# Patient Record
Sex: FEMALE | Race: BLACK OR AFRICAN AMERICAN | Employment: FULL TIME | URBAN - METROPOLITAN AREA
[De-identification: names, ages, dates, MRNs, and addresses within clinical notes are randomized per-mention and may not be internally consistent; named-entity substitution may affect disease eponyms.]

---

## 2017-05-13 ENCOUNTER — HOSPITAL ENCOUNTER (EMERGENCY)
Age: 32
Discharge: HOME | End: 2017-05-13
Attending: EMERGENCY MEDICINE
Payer: COMMERCIAL

## 2017-05-13 ENCOUNTER — APPOINTMENT (OUTPATIENT)
Dept: GENERAL RADIOLOGY | Age: 32
End: 2017-05-13
Attending: PHYSICIAN ASSISTANT
Payer: COMMERCIAL

## 2017-05-13 VITALS
HEIGHT: 65 IN | OXYGEN SATURATION: 100 % | TEMPERATURE: 98.6 F | HEART RATE: 88 BPM | BODY MASS INDEX: 30.82 KG/M2 | RESPIRATION RATE: 16 BRPM | WEIGHT: 185 LBS

## 2017-05-13 DIAGNOSIS — M54.50 ACUTE LEFT-SIDED LOW BACK PAIN WITHOUT SCIATICA: ICD-10-CM

## 2017-05-13 DIAGNOSIS — M79.642 HAND PAIN, LEFT: ICD-10-CM
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
## 2017-05-13 LAB — HCG UR QL: NEGATIVE

## 2017-05-13 PROCEDURE — 73130 X-RAY EXAM OF HAND: CPT

## 2017-05-13 PROCEDURE — 99284 EMERGENCY DEPT VISIT MOD MDM: CPT

## 2017-05-13 PROCEDURE — 72100 X-RAY EXAM L-S SPINE 2/3 VWS: CPT

## 2017-05-13 PROCEDURE — 74011250637 HC RX REV CODE- 250/637: Performed by: PHYSICIAN ASSISTANT

## 2017-05-13 PROCEDURE — 81025 URINE PREGNANCY TEST: CPT

## 2017-05-13 RX ORDER — OXYCODONE AND ACETAMINOPHEN 5; 325 MG/1; MG/1
1 TABLET ORAL
Qty: 10 TAB | Refills: 0 | Status: SHIPPED | OUTPATIENT
Start: 2017-05-13 | End: 2020-03-13

## 2017-05-13 RX ORDER — BUDESONIDE AND FORMOTEROL FUMARATE DIHYDRATE 160; 4.5 UG/1; UG/1
2 AEROSOL RESPIRATORY (INHALATION) 2 TIMES DAILY
COMMUNITY
End: 2020-08-12 | Stop reason: ALTCHOICE

## 2017-05-13 RX ORDER — METHOCARBAMOL 750 MG/1
750 TABLET, FILM COATED ORAL 4 TIMES DAILY
Qty: 12 TAB | Refills: 0 | Status: SHIPPED | OUTPATIENT
Start: 2017-05-13 | End: 2020-03-13

## 2017-05-13 RX ORDER — ONDANSETRON 4 MG/1
4 TABLET, ORALLY DISINTEGRATING ORAL
Status: COMPLETED | OUTPATIENT
Start: 2017-05-13 | End: 2017-05-13

## 2017-05-13 RX ORDER — OXYCODONE AND ACETAMINOPHEN 5; 325 MG/1; MG/1
1 TABLET ORAL
Status: COMPLETED | OUTPATIENT
Start: 2017-05-13 | End: 2017-05-13

## 2017-05-13 RX ADMIN — ONDANSETRON 4 MG: 4 TABLET, ORALLY DISINTEGRATING ORAL at 15:49

## 2017-05-13 RX ADMIN — OXYCODONE HYDROCHLORIDE AND ACETAMINOPHEN 1 TABLET: 5; 325 TABLET ORAL at 15:49

## 2017-05-13 NOTE — ED NOTES
Pt discharged by LORNE Santos. Pt provided with discharge instructions Rx and instructions on follow up care. Pt ambulated out of ED.
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
## 2017-05-13 NOTE — LETTER
[address] 
[facility] EMERGENCY DEPT 
[address] [place] [address] [address] [id number] [phone] Work/School Note Date: 5/13/2017 To Whom It May concern: 
 
Feli Ricardo was seen and treated today in the emergency room by the following provider(s): 
Attending Provider: Donna Knox DO Physician Assistant: LORNE Estevez. Feli Ricardo No work 48 hours. Sincerely, LORNE Estevez

## 2020-03-13 ENCOUNTER — OFFICE VISIT (OUTPATIENT)
Dept: FAMILY MEDICINE CLINIC | Age: 35
End: 2020-03-13

## 2020-03-13 VITALS
TEMPERATURE: 97.7 F | DIASTOLIC BLOOD PRESSURE: 76 MMHG | OXYGEN SATURATION: 98 % | SYSTOLIC BLOOD PRESSURE: 114 MMHG | BODY MASS INDEX: 30.79 KG/M2 | HEIGHT: 65 IN | HEART RATE: 71 BPM | RESPIRATION RATE: 16 BRPM

## 2020-03-13 DIAGNOSIS — J30.1 SEASONAL ALLERGIC RHINITIS DUE TO POLLEN: ICD-10-CM

## 2020-03-13 DIAGNOSIS — S46.811A STRAIN OF RIGHT TRAPEZIUS MUSCLE, INITIAL ENCOUNTER: Primary | ICD-10-CM

## 2020-03-13 RX ORDER — CYCLOBENZAPRINE HCL 5 MG
5 TABLET ORAL
Qty: 60 TAB | Refills: 0 | Status: SHIPPED | OUTPATIENT
Start: 2020-03-13 | End: 2020-04-24

## 2020-03-13 RX ORDER — AZELASTINE 1 MG/ML
1 SPRAY, METERED NASAL 2 TIMES DAILY
Qty: 1 BOTTLE | Refills: 0 | Status: SHIPPED | OUTPATIENT
Start: 2020-03-13 | End: 2020-04-06

## 2020-03-13 RX ORDER — MELOXICAM 7.5 MG/1
7.5 TABLET ORAL DAILY
Qty: 30 TAB | Refills: 0 | Status: SHIPPED | OUTPATIENT
Start: 2020-03-13 | End: 2020-04-06

## 2020-03-13 RX ORDER — MONTELUKAST SODIUM 10 MG/1
10 TABLET ORAL DAILY
Qty: 90 TAB | Refills: 1 | Status: SHIPPED | OUTPATIENT
Start: 2020-03-13 | End: 2020-08-12 | Stop reason: ALTCHOICE
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
## 2020-04-05 DIAGNOSIS — S46.811A STRAIN OF RIGHT TRAPEZIUS MUSCLE, INITIAL ENCOUNTER: ICD-10-CM

## 2020-04-05 DIAGNOSIS — J30.1 SEASONAL ALLERGIC RHINITIS DUE TO POLLEN: ICD-10-CM

## 2020-04-06 RX ORDER — AZELASTINE 1 MG/ML
1 SPRAY, METERED NASAL 2 TIMES DAILY
Qty: 1 BOTTLE | Refills: 0 | Status: SHIPPED | OUTPATIENT
Start: 2020-04-06 | End: 2021-03-10 | Stop reason: ALTCHOICE

## 2020-04-06 RX ORDER — MELOXICAM 7.5 MG/1
TABLET ORAL
Qty: 30 TAB | Refills: 0 | Status: SHIPPED | OUTPATIENT
Start: 2020-04-06 | End: 2020-08-12 | Stop reason: ALTCHOICE

## 2020-04-09 ENCOUNTER — VIRTUAL VISIT (OUTPATIENT)
Dept: FAMILY MEDICINE CLINIC | Age: 35
End: 2020-04-09

## 2020-04-09 DIAGNOSIS — S60.022A CONTUSION OF LEFT INDEX FINGER WITHOUT DAMAGE TO NAIL, INITIAL ENCOUNTER: ICD-10-CM

## 2020-04-09 DIAGNOSIS — M79.645 PAIN OF FINGER OF LEFT HAND: ICD-10-CM

## 2020-04-09 DIAGNOSIS — M77.9 TENDONITIS: Primary | ICD-10-CM

## 2020-04-09 RX ORDER — METHYLPREDNISOLONE 4 MG/1
TABLET ORAL
Qty: 1 DOSE PACK | Refills: 0 | Status: SHIPPED | OUTPATIENT
Start: 2020-04-09 | End: 2020-08-12 | Stop reason: ALTCHOICE

## 2020-04-09 NOTE — PROGRESS NOTES
Consent: Shavonne Madrigal, who was seen by synchronous (real-time) audio-video technology, and/or her healthcare decision maker, is aware that this patient-initiated, Telehealth encounter on 4/9/2020 is a billable service, with coverage as determined by her insurance carrier. She is aware that she may receive a bill and has provided verbal consent to proceed: Yes. Assessment & Plan:   Diagnoses and all orders for this visit:    1. Contusion of left index finger with damage to nail, initial encounter    2. Pain of finger of left hand        Pt was evaluated. Pt has new pain to left index finger. Pain began yesterday and today has mild swelling and increased pain to distal end of index finger. Pt has FROM but has a tightness feeling and increased pain. Pt denies any known trauma. Denies  hx of arthritis/ gout. Has been playing with child and may have injured it then. Pt to immobilize finger, use RICE. Medrol dose pack prescribed to decrease inflammation in the event of gout or tendonitis. Has normal Cap refill and color. Denies any bite or sting. I spent at least 10 minutes with this established patient, and >50% of the time was spent counseling and/or coordinating care regarding Pt present s with a 1 day hx of left index finger pain and swelling. Pain is past Distal joint, mild swelling and tenderness. Pt denies any sting or known event that caused pain. 712  Subjective:   Shavonne Madrigal is a 38 y.o. female who was seen for Finger Swelling      Prior to Admission medications    Medication Sig Start Date End Date Taking? Authorizing Provider   meloxicam (MOBIC) 7.5 mg tablet TAKE 1 TABLET BY MOUTH EVERY DAY 4/6/20   Citlaly Bowling MD   azelastine (ASTELIN) 137 mcg (0.1 %) nasal spray 1 SPRAY BY BOTH NOSTRILS ROUTE TWO (2) TIMES A DAY. USE IN EACH NOSTRIL AS DIRECTED 4/6/20   Citlaly Bowling MD   cetirizine HCl (ZYRTEC PO) Take  by mouth.     Provider, Historical   cyclobenzaprine (FLEXERIL) 5 mg tablet Take 1 Tab by mouth two (2) times daily as needed for Muscle Spasm(s). 3/13/20   Damon Bowling MD   montelukast (SINGULAIR) 10 mg tablet Take 1 Tab by mouth daily. 3/13/20   Damon Bowling MD   budesonide-formoterol (SYMBICORT) 160-4.5 mcg/actuation HFA inhaler Take 2 Puffs by inhalation two (2) times a day. Shilpi Weston MD   albuterol (PROVENTIL HFA) 90 mcg/actuation inhaler Take 2 Puffs by inhalation every four (4) hours as needed for Wheezing. 7/3/14   LORNE Conrad     Allergies   Allergen Reactions    Bee Pollen Swelling           Review of Systems   Constitutional: Negative. HENT: Negative. Eyes: Negative. Respiratory: Negative. Cardiovascular: Negative. Musculoskeletal: Positive for joint pain (left index finger pain and edema). Skin:        Edema to left index finger   Neurological: Negative. Endo/Heme/Allergies: Positive for environmental allergies. Psychiatric/Behavioral: Negative. Objective:   Vital Signs: (As obtained by patient/caregiver at home)  There were no vitals taken for this visit.      [INSTRUCTIONS:  \"[x]\" Indicates a positive item  \"[]\" Indicates a negative item  -- DELETE ALL ITEMS NOT EXAMINED]    Constitutional: [x] Appears well-developed and well-nourished [x] No apparent distress      [] Abnormal -     Mental status: [x] Alert and awake  [x] Oriented to person/place/time [x] Able to follow commands    [] Abnormal -     Eyes:   EOM    [x]  Normal    [] Abnormal -   Sclera  [x]  Normal    [] Abnormal -          Discharge [x]  None visible   [] Abnormal -     HENT: [x] Normocephalic, atraumatic  [] Abnormal -   [x] Mouth/Throat: Mucous membranes are moist    External Ears [x] Normal  [] Abnormal -    Neck: [x] No visualized mass [] Abnormal -     Pulmonary/Chest: [x] Respiratory effort normal   [x] No visualized signs of difficulty breathing or respiratory distress        [] Abnormal -      Musculoskeletal:   [x] Normal gait with no signs of ataxia         [x] Normal range of motion of neck        [x] Abnormal -left index finger pain     Neurological:        [x] No Facial Asymmetry (Cranial nerve 7 motor function) (limited exam due to video visit)          [x] No gaze palsy        [] Abnormal -          Skin:        [x] No significant exanthematous lesions or discoloration noted on facial skin         [] Abnormal -            Psychiatric:       [x] Normal Affect [] Abnormal -        [x] No Hallucinations    Other pertinent observable physical exam findings:-        We discussed the expected course, resolution and complications of the diagnosis(es) in detail. Medication risks, benefits, costs, interactions, and alternatives were discussed as indicated. I advised her to contact the office if her condition worsens, changes or fails to improve as anticipated. She expressed understanding with the diagnosis(es) and plan. Winnie Sparrow is a 38 y.o. female being evaluated by a video visit encounter for concerns as above. A caregiver was present when appropriate. Due to this being a TeleHealth encounter (During MUSWA-49 public health emergency), evaluation of the following organ systems was limited: Vitals/Constitutional/EENT/Resp/CV/GI//MS/Neuro/Skin/Heme-Lymph-Imm. Pursuant to the emergency declaration under the [facility], 1135 waiver authority and the [employer] Act, this Virtual  Visit was conducted, with patient's (and/or legal guardian's) consent, to reduce the patient's risk of exposure to COVID-19 and provide necessary medical care. Services were provided through a video synchronous discussion virtually to substitute for in-person clinic visit.    Patient on VV, provider in the office        Karol Oneil NP

## 2020-04-16 ENCOUNTER — VIRTUAL VISIT (OUTPATIENT)
Dept: FAMILY MEDICINE CLINIC | Age: 35
End: 2020-04-16

## 2020-04-16 DIAGNOSIS — M79.644 PAIN OF FINGER OF RIGHT HAND: Primary | ICD-10-CM

## 2020-04-16 RX ORDER — DICLOFENAC SODIUM 10 MG/G
1 GEL TOPICAL 2 TIMES DAILY
Qty: 100 G | Refills: 1 | Status: SHIPPED | OUTPATIENT
Start: 2020-04-16 | End: 2020-08-12 | Stop reason: ALTCHOICE

## 2020-04-16 NOTE — PROGRESS NOTES
Assessment and Plan    1. Pain of finger of right hand    - diclofenac (VOLTAREN) 1 % gel; Apply 1 g to affected area two (2) times a day. Right index finger  Dispense: 100 g; Refill: 1      Follow-up and Dispositions    · Return if symptoms worsen or fail to improve. Diagnosis and plan discussed with patient who verbillized understanding. History of present Caprice Kiser is a 38 y.o. female presenting for right index finger pain. Right index finger pain DIP joint  Noticed pain after playing with daughter. No real injury. No puncture  No prior arthritis other than mild back pain. Review of Systems   Musculoskeletal: Positive for joint pain. Past Medical History:   Diagnosis Date    Asthma     bronchitis 1yr ago    Ill-defined condition     dislocated left jaw due to [place] Palsy    Neurological disorder     [place] Palsy    Proximal phalanx fracture of finger 2010     Past Surgical History:   Procedure Laterality Date    HX ORTHOPAEDIC       No family history on file.   Social History     Socioeconomic History    Marital status: SINGLE     Spouse name: Not on file    Number of children: Not on file    Years of education: Not on file    Highest education level: Not on file   Occupational History    Not on file   Social Needs    Financial resource strain: Not on file    Food insecurity     Worry: Not on file     Inability: Not on file    Transportation needs     Medical: Not on file     Non-medical: Not on file   Tobacco Use    Smoking status: Current Some Day Smoker     Last attempt to quit: 6/3/2014     Years since quittin.8    Smokeless tobacco: Former User   Substance and Sexual Activity    Alcohol use: Yes     Comment: socially    Drug use: No    Sexual activity: Yes     Partners: Male     Birth control/protection: None, Condom   Lifestyle    Physical activity     Days per week: Not on file     Minutes per session: Not on file    Stress: Not on file   Relationships    Social connections     Talks on phone: Not on file     Gets together: Not on file     Attends Yazidi service: Not on file     Active member of club or organization: Not on file     Attends meetings of clubs or organizations: Not on file     Relationship status: Not on file    Intimate partner violence     Fear of current or ex partner: Not on file     Emotionally abused: Not on file     Physically abused: Not on file     Forced sexual activity: Not on file   Other Topics Concern    Not on file   Social History Narrative    Not on file         Prior to Admission medications    Medication Sig Start Date End Date Taking? Authorizing Provider   diclofenac (VOLTAREN) 1 % gel Apply 1 g to affected area two (2) times a day. Right index finger 4/16/20  Yes Liza Chairez MD   methylPREDNISolone (MEDROL DOSEPACK) 4 mg tablet Take as directed on dose pack  Indications: inflammation of a tendon 4/9/20   Marie Youssef NP   meloxicam (MOBIC) 7.5 mg tablet TAKE 1 TABLET BY MOUTH EVERY DAY 4/6/20   Garret Bowling MD   azelastine (ASTELIN) 137 mcg (0.1 %) nasal spray 1 SPRAY BY BOTH NOSTRILS ROUTE TWO (2) TIMES A DAY. USE IN EACH NOSTRIL AS DIRECTED 4/6/20   Garret Bowling MD   cetirizine HCl (ZYRTEC PO) Take  by mouth. Provider, Historical   cyclobenzaprine (FLEXERIL) 5 mg tablet Take 1 Tab by mouth two (2) times daily as needed for Muscle Spasm(s). 3/13/20   Garret Bowling MD   montelukast (SINGULAIR) 10 mg tablet Take 1 Tab by mouth daily. 3/13/20   Garret Bowling MD   budesonide-formoterol (SYMBICORT) 160-4.5 mcg/actuation HFA inhaler Take 2 Puffs by inhalation two (2) times a day. Enrico, MD Shilpi   albuterol (PROVENTIL HFA) 90 mcg/actuation inhaler Take 2 Puffs by inhalation every four (4) hours as needed for Wheezing. 7/3/14   LORNE Villalobos        Allergies   Allergen Reactions    Bee Pollen Swelling       There were no vitals filed for this visit.   There is no height or weight on file to calculate BMI. Objective  Physical Exam  General: Patient alert and oriented and in NAD  Right index finger- mildly tender and mildly swollen DIP. No external signs of trauma or puncture, no flex or extensor tendon laxity, no ligamentous laxity. Psych: Appropriate mood and affect, no homicidal or suicidal ideation, no obsessions, delusions or hallucinations, normal psychomotor status.

## 2020-04-22 ENCOUNTER — OFFICE VISIT (OUTPATIENT)
Dept: FAMILY MEDICINE CLINIC | Age: 35
End: 2020-04-22

## 2020-04-22 VITALS
TEMPERATURE: 98.5 F | HEART RATE: 74 BPM | RESPIRATION RATE: 16 BRPM | SYSTOLIC BLOOD PRESSURE: 105 MMHG | BODY MASS INDEX: 35.16 KG/M2 | DIASTOLIC BLOOD PRESSURE: 69 MMHG | WEIGHT: 211 LBS | HEIGHT: 65 IN

## 2020-04-22 DIAGNOSIS — J30.1 SEASONAL ALLERGIC RHINITIS DUE TO POLLEN: ICD-10-CM

## 2020-04-22 DIAGNOSIS — J01.10 ACUTE NON-RECURRENT FRONTAL SINUSITIS: Primary | ICD-10-CM

## 2020-04-22 RX ORDER — AZITHROMYCIN 250 MG/1
TABLET, FILM COATED ORAL
Qty: 6 TAB | Refills: 0 | Status: ON HOLD | OUTPATIENT
Start: 2020-04-22 | End: 2020-09-08

## 2020-04-22 RX ORDER — GUAIFENESIN 600 MG/1
600 TABLET, EXTENDED RELEASE ORAL 2 TIMES DAILY
Qty: 14 TAB | Refills: 0 | Status: SHIPPED | OUTPATIENT
Start: 2020-04-22 | End: 2020-04-29

## 2020-04-22 NOTE — PROGRESS NOTES
Sinus Pain  Patient complains of congestion, facial pain, nasal congestion and post nasal drip. Symptoms include congestion, facial pain and sinus pressure with no fever, chills, or night sweats. Onset of symptoms was 1 week ago, gradually worsening since that time. She is drinking plenty of fluids. .  Past history is significant for asthma. Patient is non-smoker    Duane Islam is a 38 y.o. female who presents to clinic today for the following:    Chief Complaint   Patient presents with    Facial Swelling      and facial pain 2or 3 months       Vitals:    04/22/20 1044   BP: 105/69   Pulse: 74   Resp: 16   Temp: 98.5 °F (36.9 °C)   Weight: 211 lb (95.7 kg)   Height: 5' 5\" (1.651 m)       Body mass index is 35.11 kg/m². Patients past medical, surgical and family histories were reviewed. Allergies and Medications reviewed and updated. Current Outpatient Medications:     diclofenac (VOLTAREN) 1 % gel, Apply 1 g to affected area two (2) times a day. Right index finger, Disp: 100 g, Rfl: 1    azelastine (ASTELIN) 137 mcg (0.1 %) nasal spray, 1 SPRAY BY BOTH NOSTRILS ROUTE TWO (2) TIMES A DAY. USE IN EACH NOSTRIL AS DIRECTED, Disp: 1 Bottle, Rfl: 0    cetirizine HCl (ZYRTEC PO), Take  by mouth., Disp: , Rfl:     montelukast (SINGULAIR) 10 mg tablet, Take 1 Tab by mouth daily. , Disp: 90 Tab, Rfl: 1    albuterol (PROVENTIL HFA) 90 mcg/actuation inhaler, Take 2 Puffs by inhalation every four (4) hours as needed for Wheezing., Disp: 1 Inhaler, Rfl: 1    methylPREDNISolone (MEDROL DOSEPACK) 4 mg tablet, Take as directed on dose pack  Indications: inflammation of a tendon, Disp: 1 Dose Pack, Rfl: 0    meloxicam (MOBIC) 7.5 mg tablet, TAKE 1 TABLET BY MOUTH EVERY DAY, Disp: 30 Tab, Rfl: 0    cyclobenzaprine (FLEXERIL) 5 mg tablet, Take 1 Tab by mouth two (2) times daily as needed for Muscle Spasm(s). , Disp: 60 Tab, Rfl: 0    budesonide-formoterol (SYMBICORT) 160-4.5 mcg/actuation HFA inhaler, Take 2 Puffs by inhalation two (2) times a day., Disp: , Rfl:     Allergies   Allergen Reactions    Bee Pollen Swelling       Past Medical History:   Diagnosis Date    Asthma     bronchitis 1yr ago    Ill-defined condition     dislocated left jaw due to [place] Palsy    Neurological disorder     [place] Palsy    Proximal phalanx fracture of finger 2010       Past Surgical History:   Procedure Laterality Date    HX ORTHOPAEDIC         History reviewed. No pertinent family history.     Social History     Socioeconomic History    Marital status: SINGLE     Spouse name: Not on file    Number of children: Not on file    Years of education: Not on file    Highest education level: Not on file   Occupational History    Not on file   Social Needs    Financial resource strain: Not on file    Food insecurity     Worry: Not on file     Inability: Not on file    Transportation needs     Medical: Not on file     Non-medical: Not on file   Tobacco Use    Smoking status: Current Some Day Smoker     Last attempt to quit: 6/3/2014     Years since quittin.8    Smokeless tobacco: Former User   Substance and Sexual Activity    Alcohol use: Yes     Comment: socially    Drug use: No    Sexual activity: Yes     Partners: Male     Birth control/protection: None, Condom   Lifestyle    Physical activity     Days per week: Not on file     Minutes per session: Not on file    Stress: Not on file   Relationships    Social connections     Talks on phone: Not on file     Gets together: Not on file     Attends Jain service: Not on file     Active member of club or organization: Not on file     Attends meetings of clubs or organizations: Not on file     Relationship status: Not on file    Intimate partner violence     Fear of current or ex partner: Not on file     Emotionally abused: Not on file     Physically abused: Not on file     Forced sexual activity: Not on file   Other Topics Concern    Not on file   Social History Narrative    Not on file           Physical Exam  Vitals signs reviewed. HENT:      Head: Normocephalic and atraumatic. Jaw: Tenderness present. Comments: TM fluid filled, non infectious     Right Ear: Tympanic membrane is bulging. Left Ear: Tympanic membrane is bulging. Nose: Congestion present. Right Turbinates: Swollen. Left Turbinates: Swollen. Right Sinus: Maxillary sinus tenderness and frontal sinus tenderness present. Left Sinus: Maxillary sinus tenderness and frontal sinus tenderness present. Comments: Nasal congestion     Mouth/Throat:      Mouth: Mucous membranes are moist.   Eyes:      Pupils: Pupils are equal, round, and reactive to light. Neck:      Musculoskeletal: Normal range of motion. Cardiovascular:      Rate and Rhythm: Normal rate. Pulmonary:      Effort: Pulmonary effort is normal.      Breath sounds: Normal breath sounds. Musculoskeletal: Normal range of motion. Skin:     General: Skin is dry. Neurological:      General: No focal deficit present. Mental Status: She is alert and oriented to person, place, and time. Mental status is at baseline. Psychiatric:         Mood and Affect: Mood normal.         Behavior: Behavior normal.         Thought Content: Thought content normal.         Judgment: Judgment normal.          HPI      Review of Systems   Constitutional: Negative. HENT: Positive for ear pain and sinus pain. Eyes: Negative. Respiratory: Negative. Cardiovascular: Negative. Gastrointestinal: Negative. Musculoskeletal: Negative. Skin: Negative. Neurological: Negative. Psychiatric/Behavioral: Negative. No results found. No results found for this or any previous visit (from the past 24 hour(s)). Assessment and Plan:    Encounter Diagnoses   Name Primary?     Acute non-recurrent frontal sinusitis Yes    Seasonal allergic rhinitis due to pollen         Pt was seen for onset of bilateral ear pain, sinus pain/ congestion. Pt has tried otc meds without relief, has been getting worse. Pt has fluid filled TM's and left enlarged tonsil. Pt today given abx for continued sinusitis, mucinex x 7 days, advised to use Zytec x 10 days, use nasal sprays and sinus wash for ROS. Take probiotic with abx, follow up as needed. 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 I have discussed the diagnosis with the patient and the intended plan as seen in the above orders. she has expressed understanding. The patient has received an after-visit summary and questions were answered concerning future plans. I have discussed medication side effects and warnings with the patient as well.         Electronically Signed: Mellisa Ross NP

## 2020-04-23 DIAGNOSIS — S46.811A STRAIN OF RIGHT TRAPEZIUS MUSCLE, INITIAL ENCOUNTER: ICD-10-CM

## 2020-04-24 RX ORDER — CYCLOBENZAPRINE HCL 5 MG
TABLET ORAL
Qty: 60 TAB | Refills: 0 | Status: SHIPPED | OUTPATIENT
Start: 2020-04-24 | End: 2020-11-04 | Stop reason: SDUPTHER

## 2020-08-12 ENCOUNTER — HOSPITAL ENCOUNTER (OUTPATIENT)
Dept: LAB | Age: 35
Discharge: HOME | End: 2020-08-12

## 2020-08-12 ENCOUNTER — VIRTUAL VISIT (OUTPATIENT)
Dept: FAMILY MEDICINE CLINIC | Age: 35
End: 2020-08-12
Payer: COMMERCIAL

## 2020-08-12 DIAGNOSIS — R91.8 HILAR MASS: ICD-10-CM

## 2020-08-12 DIAGNOSIS — M35.3 PMR (POLYMYALGIA RHEUMATICA) (HCC): ICD-10-CM

## 2020-08-12 DIAGNOSIS — R91.8 HILAR MASS: Primary | ICD-10-CM

## 2020-08-12 DIAGNOSIS — Z76.89 ENCOUNTER TO ESTABLISH CARE: ICD-10-CM

## 2020-08-12 LAB
BASOPHILS # BLD: 0 K/UL (ref 0–0.1)
BASOPHILS NFR BLD: 1 % (ref 0–1)
CRP SERPL-MCNC: 0.52 MG/DL (ref 0–0.6)
DIFFERENTIAL METHOD BLD: NORMAL
EOSINOPHIL # BLD: 0.1 K/UL (ref 0–0.4)
EOSINOPHIL NFR BLD: 2 % (ref 0–7)
ERYTHROCYTE [DISTWIDTH] IN BLOOD BY AUTOMATED COUNT: 13.2 % (ref 11.5–14.5)
ERYTHROCYTE [SEDIMENTATION RATE] IN BLOOD: 10 MM/HR (ref 0–20)
HCT VFR BLD AUTO: 41.4 % (ref 35–47)
HGB BLD-MCNC: 13.1 G/DL (ref 11.5–16)
IMM GRANULOCYTES # BLD AUTO: 0 K/UL (ref 0–0.04)
IMM GRANULOCYTES NFR BLD AUTO: 0 % (ref 0–0.5)
LYMPHOCYTES # BLD: 2 K/UL (ref 0.8–3.5)
LYMPHOCYTES NFR BLD: 35 % (ref 12–49)
MCH RBC QN AUTO: 28.1 PG (ref 26–34)
MCHC RBC AUTO-ENTMCNC: 31.6 G/DL (ref 30–36.5)
MCV RBC AUTO: 88.8 FL (ref 80–99)
MONOCYTES # BLD: 0.4 K/UL (ref 0–1)
MONOCYTES NFR BLD: 6 % (ref 5–13)
NEUTS SEG # BLD: 3.1 K/UL (ref 1.8–8)
NEUTS SEG NFR BLD: 56 % (ref 32–75)
NRBC # BLD: 0 K/UL (ref 0–0.01)
NRBC BLD-RTO: 0 PER 100 WBC
PLATELET # BLD AUTO: 238 K/UL (ref 150–400)
PMV BLD AUTO: 11.5 FL (ref 8.9–12.9)
RBC # BLD AUTO: 4.66 M/UL (ref 3.8–5.2)
WBC # BLD AUTO: 5.7 K/UL (ref 3.6–11)

## 2020-08-12 PROCEDURE — 99214 OFFICE O/P EST MOD 30 MIN: CPT | Performed by: NURSE PRACTITIONER

## 2020-08-12 RX ORDER — VALACYCLOVIR HYDROCHLORIDE 500 MG/1
TABLET, FILM COATED ORAL
COMMUNITY
Start: 2020-06-22

## 2020-08-12 NOTE — PROGRESS NOTES
Vernell Andrade is a 38 y.o. female who was seen by synchronous (real-time) audio-video technology on 8/12/2020 for Other (Pt requesting a referral for Pulmonologist )        Assessment & Plan:   Diagnoses and all orders for this visit:    1. Hilar mass  -     REFERRAL TO PULMONARY DISEASE  -     ANGIOTENSIN CONVERTING ENZYME; Future  -     CT CHEST W WO CONT; Future  - Presumed stab, referral to pulmonology, updated CT and lab work    2. PMR (polymyalgia rheumatica) (HCC)  -     SED RATE (ESR); Future  -     C REACTIVE PROTEIN, QT; Future  -     CBC WITH AUTOMATED DIFF; Future  -     DRAGAN, DIRECT, W/REFLEX; Future  -     RHEUMATOID FACTOR, QT; Future  - Unchanged, labwork today, follow up based on labwork. Consider Rheumatology referral    3. Encounter to establish care        I spent at least 25 minutes on this visit with this new patient. Subjective:    VV today for establishing care. Has a history of sarcoidosis. Had two CT scans for hilar mass found in 2016, mass was unchanged and sarcoidosis was considered. Has seen pulmonologist but never got definitive diagnosis. Would like another referral to pulmonology and updated imaging. Family history of sarcoidosis in uncle      Bilateral shoulder pain. States for the last 2-3 weeks has been waking up with bilateral shoulder pain, pain across chest and neck. Described as aching and stiffness and once \"gets moving\" will subside. Sometimes the pain will begin a gain in the evening. Has tried Aleve with minimal relief. Prior to Admission medications    Medication Sig Start Date End Date Taking? Authorizing Provider   valACYclovir (VALTREX) 500 mg tablet daily as needed. 6/22/20  Yes Provider, Historical   cyclobenzaprine (FLEXERIL) 5 mg tablet TAKE 1 TABLET BY MOUTH TWICE DAILY AS NEEDED FOR MUSCLE SPASMS 4/24/20  Yes Olesya Bowling MD   azelastine (ASTELIN) 137 mcg (0.1 %) nasal spray 1 SPRAY BY BOTH NOSTRILS ROUTE TWO (2) TIMES A DAY.  USE IN [facility] NOSTRIL AS DIRECTED 4/6/20  Yes Hlavac, Florance Hashimoto, MD   cetirizine HCl (ZYRTEC PO) Take  by mouth. Yes Provider, Francisco   albuterol (PROVENTIL HFA) 90 mcg/actuation inhaler Take 2 Puffs by inhalation every four (4) hours as needed for Wheezing. 7/3/14  Yes LORNE Veloz   azithromycin (ZITHROMAX) 250 mg tablet Take 2 tablets today, then take 1 tablet daily  Indications: sinusitis 4/22/20 4/27/20  David Mcdonald NP   diclofenac (VOLTAREN) 1 % gel Apply 1 g to affected area two (2) times a day. Right index finger 4/16/20 8/12/20  Paul Samano MD   methylPREDNISolone (MEDROL DOSEPACK) 4 mg tablet Take as directed on dose pack  Indications: inflammation of a tendon 4/9/20 8/12/20  David Mcdonald NP   meloxicam (MOBIC) 7.5 mg tablet TAKE 1 TABLET BY MOUTH EVERY DAY 4/6/20 8/12/20  Hlavac, Florance Hashimoto, MD   montelukast (SINGULAIR) 10 mg tablet Take 1 Tab by mouth daily. 3/13/20 8/12/20  Hlavac, Florance Hashimoto, MD   budesonide-formoterol (SYMBICORT) 160-4.5 mcg/actuation HFA inhaler Take 2 Puffs by inhalation two (2) times a day. 8/12/20  Shilpi Weston MD     Patient Active Problem List   Diagnosis Code    Proximal phalanx fracture of finger S62.619A       Review of Systems   Constitutional: Negative for chills, fever and malaise/fatigue. HENT: Positive for congestion. Negative for sore throat. Eyes: Negative for blurred vision. Respiratory: Negative for cough and shortness of breath. Cardiovascular: Negative for chest pain, palpitations and leg swelling. Musculoskeletal: Positive for joint pain and neck pain. Neurological: Negative for dizziness and headaches.        Objective:     Patient-Reported Vitals 8/12/2020   Patient-Reported Weight 215lb   Patient-Reported Height 5ft5in   Patient-Reported Pulse 86   Patient-Reported Temperature 98.2   Patient-Reported Systolic  249   Patient-Reported Diastolic 69        [INSTRUCTIONS:  \"[x]\" Indicates a positive item  \"[]\" Indicates a negative item  -- DELETE ALL ITEMS NOT EXAMINED]    Constitutional: [x] Appears well-developed and well-nourished [x] No apparent distress      [] Abnormal -     Mental status: [x] Alert and awake  [x] Oriented to person/place/time [x] Able to follow commands    [] Abnormal -     Eyes:   EOM    [x]  Normal    [] Abnormal -   Sclera  [x]  Normal    [] Abnormal -          Discharge [x]  None visible   [] Abnormal -     HENT: [x] Normocephalic, atraumatic  [] Abnormal -   [x] Mouth/Throat: Mucous membranes are moist    External Ears [x] Normal  [] Abnormal -    Neck: [x] No visualized mass [] Abnormal -     Pulmonary/Chest: [x] Respiratory effort normal   [x] No visualized signs of difficulty breathing or respiratory distress        [] Abnormal -      Musculoskeletal:   [x] Normal gait with no signs of ataxia         [x] Normal range of motion of neck        [] Abnormal -     Neurological:        [x] No Facial Asymmetry (Cranial nerve 7 motor function) (limited exam due to video visit)          [x] No gaze palsy        [] Abnormal -          Skin:        [x] No significant exanthematous lesions or discoloration noted on facial skin         [] Abnormal -            Psychiatric:       [x] Normal Affect [] Abnormal -        [x] No Hallucinations    Other pertinent observable physical exam findings:-        We discussed the expected course, resolution and complications of the diagnosis(es) in detail. Medication risks, benefits, costs, interactions, and alternatives were discussed as indicated. I advised her to contact the office if her condition worsens, changes or fails to improve as anticipated. She expressed understanding with the diagnosis(es) and plan. Fransico Dunn, who was evaluated through a patient-initiated, synchronous (real-time) audio-video encounter, and/or her healthcare decision maker, is aware that it is a billable service, with coverage as determined by her insurance carrier.  She provided verbal consent to proceed: Yes, and patient identification was verified. It was conducted pursuant to the emergency declaration under the [address] [place] [address] and the [employer] and [employer] General Act. A caregiver was present when appropriate. Ability to conduct physical exam was limited. I was at home. The patient was at home.       Eldon Apley, NP

## 2020-08-12 NOTE — PROGRESS NOTES
Identified pt with two pt identifiers(name and ). Reviewed record in preparation for visit and have obtained necessary documentation. Chief Complaint   Patient presents with    Other     Pt requesting a referral for Pulmonologist         Health Maintenance Due   Topic    Pneumococcal 0-64 years (1 of 1 - PPSV23)    DTaP/Tdap/Td series (1 - Tdap)    PAP AKA CERVICAL CYTOLOGY     Influenza Age 5 to Adult        Coordination of Care Questionnaire:  :   1) Have you been to an emergency room, urgent care, or hospitalized since your last visit? If yes, where when, and reason for visit? no      2. Have seen or consulted any other health care provider since your last visit? If yes, where when, and reason for visit? no        Patient is accompanied by self I have received verbal consent from Amy Schwarz to discuss any/all medical information while they are present in the room.

## 2020-08-13 LAB — RHEUMATOID FACT SERPL-ACNC: <10 IU/ML

## 2020-08-14 ENCOUNTER — HOSPITAL ENCOUNTER (OUTPATIENT)
Dept: CT IMAGING | Age: 35
Discharge: HOME | End: 2020-08-14
Attending: NURSE PRACTITIONER
Payer: COMMERCIAL

## 2020-08-14 DIAGNOSIS — R91.8 HILAR MASS: ICD-10-CM

## 2020-08-14 LAB
ACE SERPL-CCNC: 82 U/L (ref 14–82)
ANA SER QL: NEGATIVE

## 2020-08-14 PROCEDURE — 74011636320 HC RX REV CODE- 636/320: Performed by: NURSE PRACTITIONER

## 2020-08-14 PROCEDURE — 71260 CT THORAX DX C+: CPT

## 2020-08-14 RX ADMIN — IOPAMIDOL 100 ML: 755 INJECTION, SOLUTION INTRAVENOUS at 16:26

## 2020-08-25 ENCOUNTER — OFFICE VISIT (OUTPATIENT)
Dept: FAMILY MEDICINE CLINIC | Age: 35
End: 2020-08-25
Payer: COMMERCIAL

## 2020-08-25 VITALS
RESPIRATION RATE: 16 BRPM | WEIGHT: 204.6 LBS | HEIGHT: 66 IN | OXYGEN SATURATION: 100 % | DIASTOLIC BLOOD PRESSURE: 69 MMHG | HEART RATE: 79 BPM | TEMPERATURE: 98.3 F | BODY MASS INDEX: 32.88 KG/M2 | SYSTOLIC BLOOD PRESSURE: 108 MMHG

## 2020-08-25 DIAGNOSIS — M25.559 HIP PAIN: Primary | ICD-10-CM

## 2020-08-25 PROCEDURE — 99213 OFFICE O/P EST LOW 20 MIN: CPT | Performed by: NURSE PRACTITIONER

## 2020-08-25 RX ORDER — DICLOFENAC SODIUM 50 MG/1
50 TABLET, DELAYED RELEASE ORAL 2 TIMES DAILY
Qty: 30 TAB | Refills: 1 | Status: SHIPPED | OUTPATIENT
Start: 2020-08-25 | End: 2021-06-21 | Stop reason: ALTCHOICE

## 2020-08-25 NOTE — PROGRESS NOTES
Patient stated name &     Chief Complaint   Patient presents with    Hip Pain     Bilateral hip pain     Wakes her up at night      Started since this past Friday     Tried [employer] Maintenance Due   Topic    Pneumococcal 0-64 years (1 of 1 - PPSV23)    DTaP/Tdap/Td series (1 - Tdap)    PAP AKA CERVICAL CYTOLOGY        Wt Readings from Last 3 Encounters:   20 204 lb 9.6 oz (92.8 kg)   20 211 lb (95.7 kg)   17 185 lb (83.9 kg)     Temp Readings from Last 3 Encounters:   20 98.3 °F (36.8 °C) (Oral)   20 98.5 °F (36.9 °C)   20 97.7 °F (36.5 °C) (Oral)     BP Readings from Last 3 Encounters:   20 108/69   20 105/69   20 114/76     Pulse Readings from Last 3 Encounters:   20 79   20 74   20 71         Learning Assessment:  :     Learning Assessment 2014   PRIMARY LEARNER Patient   BARRIERS PRIMARY LEARNER NONE   CO-LEARNER CAREGIVER No   PRIMARY LANGUAGE ENGLISH    NEED No   LEARNER PREFERENCE PRIMARY DEMONSTRATION   LEARNING SPECIAL TOPICS None   ANSWERED BY self   RELATIONSHIP SELF       Depression Screening:  :     3 most recent PHQ Screens 2020   Little interest or pleasure in doing things Not at all   Feeling down, depressed, irritable, or hopeless Not at all   Total Score PHQ 2 0       Fall Risk Assessment:  :     No flowsheet data found. Abuse Screening:  :     No flowsheet data found. Coordination of Care Questionnaire:  :     1) Have you been to an emergency room, urgent care clinic since your last visit? No    Hospitalized since your last visit? No             2) Have you seen or consulted any other health care providers outside of [address] [place] [address] since your last visit?  No  (Include any pap smears or colon screenings in this section.)  No    Patient is accompanied by self I have received verbal consent from [facility] to discuss any/all medical information while they are present in the room.
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
## 2020-09-04 ENCOUNTER — HOSPITAL ENCOUNTER (OUTPATIENT)
Dept: LAB | Age: 35
Discharge: HOME | End: 2020-09-04
Payer: COMMERCIAL

## 2020-09-04 DIAGNOSIS — Z01.812 PRE-PROCEDURAL LABORATORY EXAMINATIONS: ICD-10-CM

## 2020-09-04 PROCEDURE — 87635 SARS-COV-2 COVID-19 AMP PRB: CPT

## 2020-09-04 NOTE — PROGRESS NOTES
[address] [place] [address] Dr Conley Preprocedure Instructions      1. On the day of your surgery, please report to registration located on the 2nd floor of the  [facility]. yes    2. You must have a responsible adult to drive you to the hospital, stay at the hospital during your procedure and drive you home. If they leave your procedure will not be started (no exceptions). yes    3. Do not have anything to eat or drink (including water, gum, mints, coffee, and juice) after midnight. This does not apply to the medications you were instructed to take by your physician. yesIf you are currently taking Plavix, Coumadin, Aspirin, or other blood-thinning agents, contact your physician for special instructions. not applicable,    4. If you are having a procedure that requires bowel prep: We recommend that you have only clear liquids the day before your procedure and begin your bowel prep by 5:00 pm.  You may continue to drink clear liquids until midnight. If for any reason you are not able to complete your prep please notify your physician immediately. not applicable    5. Have a list of all current medications, including vitamins, herbal supplements and any other over the counter medications. yes    6. If you wear glasses, contacts, dentures and/or hearing aids, they may be removed prior to procedure, please bring a case to store them in. yes    7. You should understand that if you do not follow these instructions your procedure may be cancelled. If your physical condition changes (I.e. fever, cold or flu) please contact your doctor as soon as possible. 8. It is important that you be on time.   If for any reason you are unable to keep your appointment please call )- the day of or your physicians office prior to your scheduled procedure

## 2020-09-04 NOTE — PROGRESS NOTES
Pt unable to complete full pre-call, pt verbalizes her NPO status, has already got her COVID screen, and states shes not taking any blood thinners. Will review history and full med list on procedure day.

## 2020-09-05 LAB — SARS-COV-2, COV2NT: NOT DETECTED

## 2020-09-08 ENCOUNTER — ANESTHESIA (OUTPATIENT)
Dept: ENDOSCOPY | Age: 35
End: 2020-09-08
Payer: COMMERCIAL

## 2020-09-08 ENCOUNTER — ANESTHESIA EVENT (OUTPATIENT)
Dept: ENDOSCOPY | Age: 35
End: 2020-09-08
Payer: COMMERCIAL

## 2020-09-08 ENCOUNTER — HOSPITAL ENCOUNTER (OUTPATIENT)
Age: 35
Setting detail: OUTPATIENT SURGERY
Discharge: HOME | End: 2020-09-08
Attending: INTERNAL MEDICINE | Admitting: INTERNAL MEDICINE
Payer: COMMERCIAL

## 2020-09-08 VITALS
TEMPERATURE: 99.1 F | HEIGHT: 61 IN | HEART RATE: 84 BPM | BODY MASS INDEX: 38.58 KG/M2 | WEIGHT: 204.37 LBS | SYSTOLIC BLOOD PRESSURE: 119 MMHG | RESPIRATION RATE: 23 BRPM | OXYGEN SATURATION: 98 % | DIASTOLIC BLOOD PRESSURE: 69 MMHG

## 2020-09-08 LAB — HCG UR QL: NEGATIVE

## 2020-09-08 PROCEDURE — 74011250636 HC RX REV CODE- 250/636: Performed by: NURSE ANESTHETIST, CERTIFIED REGISTERED

## 2020-09-08 PROCEDURE — 76060000032 HC ANESTHESIA 0.5 TO 1 HR: Performed by: INTERNAL MEDICINE

## 2020-09-08 PROCEDURE — 88173 CYTOPATH EVAL FNA REPORT: CPT

## 2020-09-08 PROCEDURE — 88305 TISSUE EXAM BY PATHOLOGIST: CPT

## 2020-09-08 PROCEDURE — 88172 CYTP DX EVAL FNA 1ST EA SITE: CPT

## 2020-09-08 PROCEDURE — 77030022556 HC FCPS BIOP TIS ENDOSC BSC -B: Performed by: INTERNAL MEDICINE

## 2020-09-08 PROCEDURE — 77030009046 HC CATH BRNCH BLLN OCOA -B: Performed by: INTERNAL MEDICINE

## 2020-09-08 PROCEDURE — 77030037590 HC NDL ASPIR BIOP OCOA -D: Performed by: INTERNAL MEDICINE

## 2020-09-08 PROCEDURE — 76040000007: Performed by: INTERNAL MEDICINE

## 2020-09-08 PROCEDURE — 81025 URINE PREGNANCY TEST: CPT

## 2020-09-08 PROCEDURE — 74011000250 HC RX REV CODE- 250: Performed by: NURSE ANESTHETIST, CERTIFIED REGISTERED

## 2020-09-08 PROCEDURE — 74011000250 HC RX REV CODE- 250: Performed by: INTERNAL MEDICINE

## 2020-09-08 RX ORDER — LIDOCAINE HYDROCHLORIDE 20 MG/ML
INJECTION, SOLUTION EPIDURAL; INFILTRATION; INTRACAUDAL; PERINEURAL AS NEEDED
Status: DISCONTINUED | OUTPATIENT
Start: 2020-09-08 | End: 2020-09-08 | Stop reason: HOSPADM

## 2020-09-08 RX ORDER — SODIUM CHLORIDE 9 MG/ML
INJECTION, SOLUTION INTRAVENOUS
Status: DISCONTINUED | OUTPATIENT
Start: 2020-09-08 | End: 2020-09-08 | Stop reason: HOSPADM

## 2020-09-08 RX ORDER — SODIUM CHLORIDE 0.9 % (FLUSH) 0.9 %
5-40 SYRINGE (ML) INJECTION AS NEEDED
Status: DISCONTINUED | OUTPATIENT
Start: 2020-09-08 | End: 2020-09-08 | Stop reason: HOSPADM

## 2020-09-08 RX ORDER — PROPOFOL 10 MG/ML
INJECTION, EMULSION INTRAVENOUS
Status: DISCONTINUED | OUTPATIENT
Start: 2020-09-08 | End: 2020-09-08 | Stop reason: HOSPADM

## 2020-09-08 RX ORDER — NALOXONE HYDROCHLORIDE 0.4 MG/ML
0.4 INJECTION, SOLUTION INTRAMUSCULAR; INTRAVENOUS; SUBCUTANEOUS
Status: DISCONTINUED | OUTPATIENT
Start: 2020-09-08 | End: 2020-09-08 | Stop reason: HOSPADM

## 2020-09-08 RX ORDER — SUCCINYLCHOLINE CHLORIDE 20 MG/ML
INJECTION INTRAMUSCULAR; INTRAVENOUS AS NEEDED
Status: DISCONTINUED | OUTPATIENT
Start: 2020-09-08 | End: 2020-09-08 | Stop reason: HOSPADM

## 2020-09-08 RX ORDER — SODIUM CHLORIDE 0.9 % (FLUSH) 0.9 %
5-40 SYRINGE (ML) INJECTION EVERY 8 HOURS
Status: DISCONTINUED | OUTPATIENT
Start: 2020-09-08 | End: 2020-09-08 | Stop reason: HOSPADM

## 2020-09-08 RX ORDER — FLUMAZENIL 0.1 MG/ML
0.2 INJECTION INTRAVENOUS
Status: DISCONTINUED | OUTPATIENT
Start: 2020-09-08 | End: 2020-09-08 | Stop reason: HOSPADM

## 2020-09-08 RX ORDER — ROCURONIUM BROMIDE 10 MG/ML
INJECTION, SOLUTION INTRAVENOUS AS NEEDED
Status: DISCONTINUED | OUTPATIENT
Start: 2020-09-08 | End: 2020-09-08 | Stop reason: HOSPADM

## 2020-09-08 RX ORDER — PROPOFOL 10 MG/ML
INJECTION, EMULSION INTRAVENOUS AS NEEDED
Status: DISCONTINUED | OUTPATIENT
Start: 2020-09-08 | End: 2020-09-08 | Stop reason: HOSPADM

## 2020-09-08 RX ORDER — FENTANYL CITRATE 50 UG/ML
INJECTION, SOLUTION INTRAMUSCULAR; INTRAVENOUS AS NEEDED
Status: DISCONTINUED | OUTPATIENT
Start: 2020-09-08 | End: 2020-09-08 | Stop reason: HOSPADM

## 2020-09-08 RX ORDER — LIDOCAINE HYDROCHLORIDE 40 MG/ML
50 SOLUTION TOPICAL ONCE
Status: COMPLETED | OUTPATIENT
Start: 2020-09-08 | End: 2020-09-08

## 2020-09-08 RX ADMIN — Medication 10 ML: at 14:21

## 2020-09-08 RX ADMIN — PROPOFOL 100 MG: 10 INJECTION, EMULSION INTRAVENOUS at 14:01

## 2020-09-08 RX ADMIN — FENTANYL CITRATE 100 MCG: 0.05 INJECTION, SOLUTION INTRAMUSCULAR; INTRAVENOUS at 13:52

## 2020-09-08 RX ADMIN — SODIUM CHLORIDE: 9 INJECTION, SOLUTION INTRAVENOUS at 14:05

## 2020-09-08 RX ADMIN — LIDOCAINE HYDROCHLORIDE 60 MG: 20 INJECTION, SOLUTION INTRAVENOUS at 13:52

## 2020-09-08 RX ADMIN — SUCCINYLCHOLINE CHLORIDE 100 MG: 20 INJECTION, SOLUTION INTRAMUSCULAR; INTRAVENOUS; PARENTERAL at 13:52

## 2020-09-08 RX ADMIN — SODIUM CHLORIDE: 9 INJECTION, SOLUTION INTRAVENOUS at 13:33

## 2020-09-08 RX ADMIN — PROPOFOL 200 MG: 10 INJECTION, EMULSION INTRAVENOUS at 13:52

## 2020-09-08 RX ADMIN — ROCURONIUM BROMIDE 10 MG: 10 INJECTION INTRAVENOUS at 13:52

## 2020-09-08 RX ADMIN — PROPOFOL 180 MCG/KG/MIN: 10 INJECTION, EMULSION INTRAVENOUS at 13:52

## 2020-09-08 RX ADMIN — PROPOFOL 100 MG: 10 INJECTION, EMULSION INTRAVENOUS at 14:16

## 2020-09-08 RX ADMIN — LIDOCAINE HYDROCHLORIDE 5 ML: 40 SOLUTION TOPICAL at 13:26

## 2020-09-08 NOTE — DISCHARGE SUMMARY
[facility], [address]  [id number]  1985       DISCHARGE INSTRUCTIONS  Discomfort:  Sore throat- throat lozenges or warm salt water gargle  redness at IV site- apply warm compress to area; if redness or soreness persist- contact your physician  Gaseous discomfort- walking, belching will help relieve any discomfort  You may not operate a vehicle for 12 hours  You may not engage in an occupation involving machinery or appliances for rest of today  You may not drink alcoholic beverages for at least 12 hours  Avoid making any critical decisions for at least 24 hour  Blood tinged secretions  this should stop within 2-3 hours  DIET  Nothing by mouth- do not eat or drink for two hours. You may eat and drink after4:30   You may resume your regular diet  however -  remember your colon is empty     and a heavy meal will produce gas. Avoid these foods:  vegetables, fried / greasy foods, carbonated drinks  ACTIVITY  You may resume your normal daily activities however it is recommended that you spend the remainder of the day resting -  avoid any strenuous activity. CALL M.D.   ANY SIGN OF   Increasing pain, nausea, vomiting  Abdominal distension (swelling)  New increased bleeding (oral or rectal)  Fever (chills)  Pain in chest area  Bloody discharge from nose or mouth  Shortness of breath    Call Dr. Shantell Leigh office for following  Results of procedure / biopsy in 7-10 days  Telephone #  [phone]

## 2020-09-08 NOTE — PROCEDURES
[address]  [address]  [address]  [place] [id number] [phone]  1985  [id number]      Date of Procedure: 9/8/2020    Preoperative diagnosis: MEDIASTINAL ADENOPATHY    Procedure: Procedure(s):  ENDOSCOPIC BRONCHOSCOPY ULTRASOUND (EBUS)  BRONCHOSCOPY  FINE NEEDLE ASPIRATION    Indication: abnormal CT, mediastinal LAD    :  Oneyda Erickson MD    Assistant(s): Endoscopy Technician-1: David Mathew  Endoscopy RN-1: Indy Hall RN    Anesthesia/Sedation:  General anesthesia      Procedure Details:  After infomed consent was obtained for the procedure, with all risks and benefits of procedure explained the patient was taken to the endoscopy suite and placed in the supine position. Following sequential administration of sedation as per above, the bronchoscope was inserted into the ETT and advanced under direct vision to the lungs bilaterally. No gross anatomical or mucosal abnormalities. The bronchoscope was withdrawn and the EBUS scope advanced. Five passes and five samples were sent from station 4R. Prelim findings noted granulomas. Complications:   None noted; patient tolerated the procedure well.     EBL:  Minimal           Impression:   Abnormal CT, mediastinal/hilar LAD    Recommendations:   Await biopsy results  Discharge when stable      Kristian Alexis MD  9/8/2020  2:25 PM

## 2020-09-08 NOTE — ANESTHESIA POSTPROCEDURE EVALUATION
Procedure(s):  ENDOSCOPIC BRONCHOSCOPY ULTRASOUND (EBUS)  BRONCHOSCOPY  FINE NEEDLE ASPIRATION. general    Anesthesia Post Evaluation      Multimodal analgesia: multimodal analgesia not used between 6 hours prior to anesthesia start to PACU discharge  Patient location during evaluation: PACU  Patient participation: complete - patient participated  Level of consciousness: awake and alert  Pain score: 0  Pain management: satisfactory to patient  Airway patency: patent  Anesthetic complications: no  Cardiovascular status: acceptable  Respiratory status: acceptable  Hydration status: acceptable  Post anesthesia nausea and vomiting:  none  Final Post Anesthesia Temperature Assessment:  Normothermia (36.0-37.5 degrees C)      INITIAL Post-op Vital signs:   Vitals Value Taken Time   /67 9/8/2020  2:51 PM   Temp     Pulse 82 9/8/2020  2:56 PM   Resp 25 9/8/2020  2:56 PM   SpO2 96 % 9/8/2020  2:56 PM   Vitals shown include unvalidated device data.

## 2020-09-08 NOTE — ROUTINE PROCESS
Endoscopy discharge instructions have been reviewed and given to patient. The patient verbalized understanding and acceptance of instructions. Dr. Abraham Prasad discussed with patient procedure findings and next steps.

## 2020-09-08 NOTE — ANESTHESIA PREPROCEDURE EVALUATION
Relevant Problems   No relevant active problems       Anesthetic History   No history of anesthetic complications            Review of Systems / Medical History  Patient summary reviewed and pertinent labs reviewed    Pulmonary            Asthma : well controlled       Neuro/Psych   Within defined limits           Cardiovascular  Within defined limits                Exercise tolerance: >4 METS     GI/Hepatic/Renal  Within defined limits              Endo/Other  Within defined limits           Other Findings              Physical Exam    Airway  Mallampati: II  TM Distance: 4 - 6 cm  Neck ROM: normal range of motion   Mouth opening: Normal     Cardiovascular    Rhythm: regular  Rate: normal         Dental  No notable dental hx       Pulmonary  Breath sounds clear to auscultation               Abdominal  GI exam deferred       Other Findings            Anesthetic Plan    ASA: 2  Anesthesia type: general          Induction: Intravenous  Anesthetic plan and risks discussed with: Patient

## 2020-09-08 NOTE — ROUTINE PROCESS
[address]oc[address]ne 1985 
[id number] Situation: 
Verbal report received from: Sharona Ireland Procedure: Procedure(s): ENDOSCOPIC BRONCHOSCOPY ULTRASOUND (EBUS) BRONCHOSCOPY 
FINE NEEDLE ASPIRATION Background: 
 
Preoperative diagnosis: MEDIASTINAL ADENOPATHY Postoperative diagnosis: 1.- Abnormal CT Scan :  Dr. Jenae Rothman Assistant(s): Endoscopy Technician-1: Tasha Rasmussen Endoscopy RN-1: Sandhya Nova RN Specimens: * No specimens in log * H. Pylori  no Assessment: 
Intra-procedure medications Anesthesia gave intra-procedure sedation and medications, see anesthesia flow sheet yes Intravenous fluids: NS@ Dominique Maninder Vital signs stable Abdominal assessment: round and soft  
scd's in place. Recommendation: 
Discharge patient per MD order. Cousin Permission to share finding with family or friend no

## 2020-09-08 NOTE — PROGRESS NOTES

## 2020-09-09 ENCOUNTER — PATIENT OUTREACH (OUTPATIENT)
Dept: OTHER | Age: 35
End: 2020-09-09

## 2020-09-09 NOTE — PROGRESS NOTES
Initial HPRP:   Patient on report as discharged from [facility] OP Visit 9/8/20 for Bronchoscopy.  Initial attempt to contact patient for transitions of care.  Left discreet message on voicemail with this Care Coordinator's contact information.  Will attempt outreach on 9/10/20.      Call your doctor now or seek immediate medical care if:  Increasing pain, nausea, vomiting  Abdominal distension (swelling)  New increased bleeding (oral or rectal)  Fever (chills)  Pain in chest area  Bloody discharge from nose or mouth  Shortness of breath

## 2020-09-10 ENCOUNTER — PATIENT OUTREACH (OUTPATIENT)
Dept: OTHER | Age: 35
End: 2020-09-10

## 2020-09-10 NOTE — PROGRESS NOTES
Patient identified as eligible for [address] [place] [address] services. Second telephone outreach attempted. Left discreet voicemail with this CM confidential contact information. Will send UTR letter via [address] Next Outreach 9/29/20 f/u - Post Op Assessment/Appt.

## 2020-09-10 NOTE — LETTER
9/10/2020 11:52 AM 
 
Ms. Loann Hamman [address] [address] Dear Harinineo Szymanskian My name is Swetha Wu, Employee Care Coordinator for [facility] and I have been trying to reach you. The Employee Care Management [facility]) program is a free-of-charge confidential service provided to our employees and their family members covered by the [facility]. . The program will provide an employee and his/her family with the [employer] expertise to assist in navigating the health care delivery system, provider services, and their overall care needsso as to assure and improve health care interactions and enhance the quality of life. This program is designed to provide you with the opportunity to have a [employer] care manager partner with you for the following services: 
 
 1) when you come home from the hospital or emergency room 2) when help is needed to manage your disease 3) when you need assistance coordinating services or appointments [facility] is dedicated to empowering the good health of its community and improving the quality of care and care experiences for employees and their families. We are committed to safeguarding patient confidentiality and privacy, assuring that every employee has the respect he or she deserves in managing their health. The information shared with your care manager will not be shared with anyone else aside from those you identify as part of your care team, and will only be used to assist you with any identified care needs. Please contact me if you would like this service provided to you. Sincerely, 
 
Zach Glass LPN  [place] [facility] Care Coordinator [address], [address] [facility]  [address] C [phone]  F [phone]  [email or web address]

## 2020-09-11 ENCOUNTER — TELEPHONE (OUTPATIENT)
Dept: FAMILY MEDICINE CLINIC | Age: 35
End: 2020-09-11

## 2020-09-29 ENCOUNTER — PATIENT OUTREACH (OUTPATIENT)
Dept: OTHER | Age: 35
End: 2020-09-29

## 2020-09-29 NOTE — PROGRESS NOTES
HPRP f/u:  Telephone attempt to contact patient for Health Promotion and Risk Prevention. Left discreet message on voicemail with this CC contact information. Will follow for one month for transitions of care needs. Next outreach is 10/14/20 for discussion f/u - Post Op Assessment and Resolve Episode.

## 2020-10-01 ENCOUNTER — E-VISIT (OUTPATIENT)
Dept: FAMILY MEDICINE CLINIC | Age: 35
End: 2020-10-01
Payer: COMMERCIAL

## 2020-10-01 DIAGNOSIS — B37.31 VAGINAL CANDIDIASIS: Primary | ICD-10-CM

## 2020-10-01 PROCEDURE — 99421 OL DIG E/M SVC 5-10 MIN: CPT | Performed by: NURSE PRACTITIONER

## 2020-10-01 RX ORDER — FLUCONAZOLE 150 MG/1
150 TABLET ORAL DAILY
Qty: 1 TAB | Refills: 0 | Status: SHIPPED | OUTPATIENT
Start: 2020-10-01 | End: 2020-10-02

## 2020-10-01 NOTE — TELEPHONE ENCOUNTER
Patient complains of vaginal itching for 3 days, had recent antibiotic use. Symptoms are unchanged. Assessment & Plan 1. Vaginal candidiasis 
 
- fluconazole (DIFLUCAN) 150 mg tablet; Take 1 Tab by mouth daily for 1 day. FDA advises cautious prescribing of oral fluconazole in pregnancy. Dispense: 1 Tab; Refill: 0 
-follow up if symptoms do not improve

## 2020-10-14 ENCOUNTER — PATIENT OUTREACH (OUTPATIENT)
Dept: OTHER | Age: 35
End: 2020-10-14

## 2020-10-14 NOTE — LETTER
10/14/2020 10:10 AM 
 
Ms. Georgette Jimenes [address] [address] Dear Georgette Jimenes My name is Kennedy Aguilar,  Associate Care Coordinator for [facility] [place], and I have been trying to reach you. The Employee Care Management (ACM) program is a free-of-charge, confidential service provided to our employees and their family members covered by the [facility]. I can help you with care transitions such as when you come home from the hospital, when help is needed to manage your disease, or when you need assistance coordinating services or appointments. As healthcare providers, we know that patients do better when they have close follow up with a primary care provider (PCP). I can help you find one that is convenient to you and covered by your insurance. I can also help you understand any after visit instructions, such as what symptoms to watch out for, or any new or changed medications. We can work together using your preferred communication -- telephone, email, MyChart. If you do not have a [employer] account, I can help you request access. Our program is designed to provide you with the opportunity to have a [employer] care manager partner with you for your healthcare needs. Due to not being able to reach you, I am closing out the current program, but will remain available to you should you have any questions. Please contact me at the below number if I can provide you with assistance for any of the above services. Sincerely, 
 
Liang Chowdhury LPN  [place] Care Coordinator [facility] [address], [address] [facility]  [address] C [phone]  F [phone]  [email or web address] [email or web address]

## 2020-11-04 DIAGNOSIS — S46.811A STRAIN OF RIGHT TRAPEZIUS MUSCLE, INITIAL ENCOUNTER: ICD-10-CM

## 2020-11-04 RX ORDER — CYCLOBENZAPRINE HCL 5 MG
5 TABLET ORAL
Qty: 60 TAB | Refills: 0 | Status: SHIPPED | OUTPATIENT
Start: 2020-11-04 | End: 2020-12-21 | Stop reason: SDUPTHER

## 2020-11-06 ENCOUNTER — CLINICAL SUPPORT (OUTPATIENT)
Dept: FAMILY MEDICINE CLINIC | Age: 35
End: 2020-11-06
Payer: COMMERCIAL

## 2020-11-06 VITALS
HEART RATE: 78 BPM | DIASTOLIC BLOOD PRESSURE: 70 MMHG | BODY MASS INDEX: 37.95 KG/M2 | WEIGHT: 201 LBS | HEIGHT: 61 IN | TEMPERATURE: 98.2 F | SYSTOLIC BLOOD PRESSURE: 110 MMHG | OXYGEN SATURATION: 99 % | RESPIRATION RATE: 16 BRPM

## 2020-11-06 DIAGNOSIS — Z23 NEEDS FLU SHOT: ICD-10-CM

## 2020-11-06 DIAGNOSIS — Z23 ENCOUNTER FOR IMMUNIZATION: Primary | ICD-10-CM

## 2020-11-06 PROCEDURE — 90471 IMMUNIZATION ADMIN: CPT

## 2020-11-06 PROCEDURE — 90686 IIV4 VACC NO PRSV 0.5 ML IM: CPT

## 2020-11-06 RX ORDER — MONTELUKAST SODIUM 10 MG/1
TABLET ORAL
COMMUNITY
Start: 2020-10-06 | End: 2021-03-19 | Stop reason: ALTCHOICE

## 2020-11-06 RX ORDER — BUDESONIDE AND FORMOTEROL FUMARATE DIHYDRATE 160; 4.5 UG/1; UG/1
AEROSOL RESPIRATORY (INHALATION)
COMMUNITY
Start: 2020-09-11

## 2020-11-06 NOTE — PATIENT INSTRUCTIONS
Vaccine Information Statement Influenza (Flu) Vaccine (Inactivated or Recombinant): What You Need to Know Many Vaccine Information Statements are available in English and other languages. See www.immunize.org/vis Hojas de información sobre vacunas están disponibles en español y en muchos otros idiomas. Visite www.immunize.org/vis 1. Why get vaccinated? Influenza vaccine can prevent influenza (flu). Flu is a contagious disease that spreads around the [facility] every year, usually between October and May. Anyone can get the flu, but it is more dangerous for some people. Infants and young children, people 72years of age and older, pregnant women, and people with certain health conditions or a weakened immune system are at greatest risk of flu complications. Pneumonia, bronchitis, sinus infections and ear infections are examples of flu-related complications. If you have a medical condition, such as heart disease, cancer or diabetes, flu can make it worse. Flu can cause fever and chills, sore throat, muscle aches, fatigue, cough, headache, and runny or stuffy nose. Some people may have vomiting and diarrhea, though this is more common in children than adults. Each year thousands of people in the [facility] die from flu, and many more are hospitalized. Flu vaccine prevents millions of illnesses and flu-related visits to the doctor each year. 2. Influenza vaccines CDC recommends everyone 10months of age and older get vaccinated every flu season. Children 6 months through 6years of age may need 2 doses during a single flu season. Everyone else needs only 1 dose each flu season. It takes about 2 weeks for protection to develop after vaccination. There are many flu viruses, and they are always changing. Each year a new flu vaccine is made to protect against three or four viruses that are likely to cause disease in the upcoming flu season.  Even when the vaccine doesnt exactly match these viruses, it may still provide some protection. Influenza vaccine does not cause flu. Influenza vaccine may be given at the same time as other vaccines. 3. Talk with your health care provider Tell your vaccine provider if the person getting the vaccine: 
 Has had an allergic reaction after a previous dose of influenza vaccine, or has any severe, life-threatening allergies.  Has ever had Guillain-Barré Syndrome (also called GBS). In some cases, your health care provider may decide to postpone influenza vaccination to a future visit. People with minor illnesses, such as a cold, may be vaccinated. People who are moderately or severely ill should usually wait until they recover before getting influenza vaccine. Your health care provider can give you more information. 4. Risks of a reaction  Soreness, redness, and swelling where shot is given, fever, muscle aches, and headache can happen after influenza vaccine.  There may be a very small increased risk of Guillain-Barré Syndrome (GBS) after inactivated influenza vaccine (the flu shot). Matilde Chavez children who get the flu shot along with pneumococcal vaccine (PCV13), and/or DTaP vaccine at the same time might be slightly more likely to have a seizure caused by fever. Tell your health care provider if a child who is getting flu vaccine has ever had a seizure. People sometimes faint after medical procedures, including vaccination. Tell your provider if you feel dizzy or have vision changes or ringing in the ears. As with any medicine, there is a very remote chance of a vaccine causing a severe allergic reaction, other serious injury, or death. 5. What if there is a serious problem? An allergic reaction could occur after the vaccinated person leaves the clinic.  If you see signs of a severe allergic reaction (hives, swelling of the face and throat, difficulty breathing, a fast heartbeat, dizziness, or weakness), call 9-1-1 and get the person to the nearest hospital. 
 
For other signs that concern you, call your health care provider. Adverse reactions should be reported to the Vaccine Adverse Event Reporting System (VAERS). Your health care provider will usually file this report, or you can do it yourself. Visit the VAERS website at www.vaers. hhs.gov or call 4-068-155-530-198-1280. VAERS is only for reporting reactions, and VAERS staff do not give medical advice. 6. The National Vaccine Injury Compensation Program 
 
The [facility] Vaccine Injury Compensation Program (VICP) is a federal program that was created to compensate people who may have been injured by certain vaccines. Visit the VICP website at www.hrsa.gov/vaccinecompensation or call 0-376.851.5451 to learn about the program and about filing a claim. There is a time limit to file a claim for compensation. 7. How can I learn more?  Ask your health care provider.  Call your local or state health department.  Contact the Centers for Disease Control and Prevention (CDC): 
- Call 5-984.392.2545 (1-800-CDC-INFO) or 
- Visit CDCs influenza website at www.cdc.gov/flu Vaccine Information Statement (Interim) Inactivated Influenza Vaccine 8/15/2019 
42 DREW Read [id number] Department of [facility] and Data [employer] Centers for Disease Control and Prevention Office Use Only

## 2020-11-06 NOTE — PROGRESS NOTES
Identified pt with two pt identifiers(name and ). Reviewed record in preparation for visit and have obtained necessary documentation. Chief Complaint   Patient presents with    Immunization/Injection     Flu vaccine        Health Maintenance Due   Topic    Pneumococcal 0-64 years (1 of 1 - PPSV23)    DTaP/Tdap/Td series (1 - Tdap)    PAP AKA CERVICAL CYTOLOGY     Flu Vaccine (1)   Patient accepts flu vaccine    Coordination of Care Questionnaire:  :   1) Have you been to an emergency room, urgent care, or hospitalized since your last visit? If yes, where when, and reason for visit? no      2. Have seen or consulted any other health care provider since your last visit? If yes, where when, and reason for visit?  no        Patient is accompanied by self I have received verbal consent from Joe Rm to discuss any/all medical information while they are present in the room.

## 2020-11-19 ENCOUNTER — VIRTUAL VISIT (OUTPATIENT)
Dept: FAMILY MEDICINE CLINIC | Age: 35
End: 2020-11-19

## 2020-11-19 ENCOUNTER — VIRTUAL VISIT (OUTPATIENT)
Dept: FAMILY MEDICINE CLINIC | Age: 35
End: 2020-11-19
Payer: COMMERCIAL

## 2020-11-19 DIAGNOSIS — F41.9 ANXIETY: Primary | ICD-10-CM

## 2020-11-19 PROCEDURE — 99214 OFFICE O/P EST MOD 30 MIN: CPT | Performed by: NURSE PRACTITIONER

## 2020-11-19 RX ORDER — BUSPIRONE HYDROCHLORIDE 7.5 MG/1
7.5 TABLET ORAL 2 TIMES DAILY
Qty: 60 TAB | Refills: 0 | Status: SHIPPED | OUTPATIENT
Start: 2020-11-19 | End: 2020-12-21 | Stop reason: SDUPTHER

## 2020-11-19 RX ORDER — HYDROXYZINE 25 MG/1
25 TABLET, FILM COATED ORAL
Qty: 30 TAB | Refills: 1 | Status: SHIPPED | OUTPATIENT
Start: 2020-11-19 | End: 2020-11-29

## 2020-11-19 NOTE — PROGRESS NOTES
Misael Lauren is a 38 y.o. female who was seen by synchronous (real-time) audio-video technology on 11/19/2020 for No chief complaint on file. Assessment & Plan:   Diagnoses and all orders for this visit:    1. Anxiety  -     busPIRone (BUSPAR) 7.5 mg tablet; Take 1 Tab by mouth two (2) times a day. -     hydrOXYzine HCL (ATARAX) 25 mg tablet; Take 1 Tab by mouth three (3) times daily as needed for Anxiety for up to 10 days. - Worsening, start Buspar as directed, side effects discussed. Hydroxyzine as directed, side effects discussed. Follow up in 3-4 weeks. Discussed the need for counseling/therapy and down time to improve stress and mood. I spent at least 17 minutes on this visit with this established patient. Subjective:   VV today for anxiety. Anxiety  Patient complains of evaluation of anxiety disorder. She has the following anxiety symptoms: palpitations, insomnia, racing thoughts, feelings of losing control. Onset of symptoms was approximately 2 months ago, gradually worsening since that time. She denies current suicidal and homicidal ideation. Family history significant for anxiety. Possible organic causes contributing are: none. Risk factors: pandemic, stress from family Previous treatment includes none and no other therapies. She complains of the following side effects from the treatment: none. Prior to Admission medications    Medication Sig Start Date End Date Taking? Authorizing Provider   busPIRone (BUSPAR) 7.5 mg tablet Take 1 Tab by mouth two (2) times a day. 11/19/20  Yes Jerri Barrow NP   hydrOXYzine HCL (ATARAX) 25 mg tablet Take 1 Tab by mouth three (3) times daily as needed for Anxiety for up to 10 days.  11/19/20 11/29/20 Yes Jerri Barrow NP   Symbicort 160-4.5 mcg/actuation HFAA TAKE 2 PUFFS BY MOUTH TWICE A DAY (RINSE MOUTH AFTER USE) 9/11/20  Yes Provider, Historical   montelukast (SINGULAIR) 10 mg tablet  10/6/20  Yes Provider, Historical cyclobenzaprine (FLEXERIL) 5 mg tablet Take 1 Tab by mouth two (2) times daily as needed for Muscle Spasm(s). 11/4/20  Yes Trey Bowling MD   diclofenac EC (VOLTAREN) 50 mg EC tablet Take 1 Tab by mouth two (2) times a day. 8/25/20  Yes Volodymyr Barrow NP   valACYclovir (VALTREX) 500 mg tablet daily as needed. 6/22/20  Yes Provider, Historical   azelastine (ASTELIN) 137 mcg (0.1 %) nasal spray 1 SPRAY BY BOTH NOSTRILS ROUTE TWO (2) TIMES A DAY. USE IN EACH NOSTRIL AS DIRECTED 4/6/20  Yes Trey Bowling MD   cetirizine HCl (ZYRTEC PO) Take  by mouth. Yes Provider, Historical   albuterol (PROVENTIL HFA) 90 mcg/actuation inhaler Take 2 Puffs by inhalation every four (4) hours as needed for Wheezing. 7/3/14  Yes LORNE Renee     Patient Active Problem List   Diagnosis Code    Proximal phalanx fracture of finger S62.619A       Review of Systems   Constitutional: Negative for chills, fever and malaise/fatigue. Eyes: Negative for blurred vision. Respiratory: Negative for cough and shortness of breath. Cardiovascular: Negative for chest pain, palpitations and leg swelling. Neurological: Negative for dizziness and headaches. Psychiatric/Behavioral: Negative for depression, hallucinations, memory loss, substance abuse and suicidal ideas. The patient is nervous/anxious. The patient does not have insomnia.         Objective:     Patient-Reported Vitals 11/19/2020   Patient-Reported Weight 202lb   Patient-Reported Height -   Patient-Reported Pulse -   Patient-Reported Temperature -   Patient-Reported Systolic  -   Patient-Reported Diastolic -   Patient-Reported LMP this month        [INSTRUCTIONS:  \"[x]\" Indicates a positive item  \"[]\" Indicates a negative item  -- DELETE ALL ITEMS NOT EXAMINED]    Constitutional: [x] Appears well-developed and well-nourished [x] No apparent distress      [] Abnormal -     Mental status: [x] Alert and awake  [x] Oriented to person/place/time [x] Able to follow commands    [] Abnormal -     Eyes:   EOM    [x]  Normal    [] Abnormal -   Sclera  [x]  Normal    [] Abnormal -          Discharge [x]  None visible   [] Abnormal -     HENT: [x] Normocephalic, atraumatic  [] Abnormal -   [x] Mouth/Throat: Mucous membranes are moist    External Ears [x] Normal  [] Abnormal -    Neck: [x] No visualized mass [] Abnormal -     Pulmonary/Chest: [x] Respiratory effort normal   [x] No visualized signs of difficulty breathing or respiratory distress        [] Abnormal -      Musculoskeletal:   [x] Normal gait with no signs of ataxia         [x] Normal range of motion of neck        [] Abnormal -     Neurological:        [x] No Facial Asymmetry (Cranial nerve 7 motor function) (limited exam due to video visit)          [x] No gaze palsy        [] Abnormal -          Skin:        [x] No significant exanthematous lesions or discoloration noted on facial skin         [] Abnormal -            Psychiatric:       [x] Normal Affect [] Abnormal -        [x] No Hallucinations    Other pertinent observable physical exam findings:-        We discussed the expected course, resolution and complications of the diagnosis(es) in detail. Medication risks, benefits, costs, interactions, and alternatives were discussed as indicated. I advised her to contact the office if her condition worsens, changes or fails to improve as anticipated. She expressed understanding with the diagnosis(es) and plan. Nano Aguilar, who was evaluated through a patient-initiated, synchronous (real-time) audio-video encounter, and/or her healthcare decision maker, is aware that it is a billable service, with coverage as determined by her insurance carrier. She provided verbal consent to proceed: Yes, and patient identification was verified.  It was conducted pursuant to the emergency declaration under the [facility] waiver authority and the [employer] and [employer] Supplemental Appropriations Act. A caregiver was present when appropriate. Ability to conduct physical exam was limited. I was at home. The patient was at home.       Ira Nagel NP

## 2020-11-19 NOTE — PROGRESS NOTES
Darek Lepe is a 38 y.o. female      Chief Complaint   Patient presents with    Anxiety         1. Have you been to the ER, urgent care clinic since your last visit? Hospitalized since your last visit? No      2. Have you seen or consulted any other health care providers outside of the [address] [place] [address] since your last visit? Include any pap smears or colon screening. No             This encounter was created in error - please disregard.

## 2021-02-26 DIAGNOSIS — F41.9 ANXIETY: ICD-10-CM

## 2021-02-26 RX ORDER — BUSPIRONE HYDROCHLORIDE 7.5 MG/1
7.5 TABLET ORAL 2 TIMES DAILY
Qty: 120 TAB | Refills: 1 | Status: SHIPPED | OUTPATIENT
Start: 2021-02-26 | End: 2021-08-02 | Stop reason: SDUPTHER

## 2021-03-10 ENCOUNTER — VIRTUAL VISIT (OUTPATIENT)
Dept: FAMILY MEDICINE CLINIC | Age: 36
End: 2021-03-10
Payer: COMMERCIAL

## 2021-03-10 DIAGNOSIS — M40.202 KYPHOSIS OF CERVICAL REGION, UNSPECIFIED KYPHOSIS TYPE: ICD-10-CM

## 2021-03-10 DIAGNOSIS — D86.9 SARCOIDOSIS: ICD-10-CM

## 2021-03-10 DIAGNOSIS — S46.811D STRAIN OF RIGHT TRAPEZIUS MUSCLE, SUBSEQUENT ENCOUNTER: Primary | ICD-10-CM

## 2021-03-10 PROCEDURE — 99213 OFFICE O/P EST LOW 20 MIN: CPT | Performed by: NURSE PRACTITIONER

## 2021-03-10 RX ORDER — ALBUTEROL SULFATE 90 UG/1
2 AEROSOL, METERED RESPIRATORY (INHALATION)
Qty: 1 INHALER | Refills: 1 | Status: SHIPPED | OUTPATIENT
Start: 2021-03-10

## 2021-03-10 RX ORDER — CYCLOBENZAPRINE HCL 5 MG
5 TABLET ORAL
Qty: 60 TAB | Refills: 1 | Status: SHIPPED | OUTPATIENT
Start: 2021-03-10 | End: 2021-06-09

## 2021-03-10 NOTE — PROGRESS NOTES
Chad Silva is a 38 y.o. female who was seen by synchronous (real-time) audio-video technology on 3/10/2021 for No chief complaint on file. Assessment & Plan:   Diagnoses and all orders for this visit:    1. Strain of right trapezius muscle, subsequent encounter  -     cyclobenzaprine (FLEXERIL) 5 mg tablet; Take 1 Tab by mouth two (2) times daily as needed for Muscle Spasm(s). - Unchanged and intermittent, refill of flexeril today. Side effects discussed. Follow up if symptoms persist.     2. Kyphosis of cervical region, unspecified kyphosis type    3. Sarcoidosis  -     albuterol (Proventil HFA) 90 mcg/actuation inhaler; Take 2 Puffs by inhalation every four (4) hours as needed for Wheezing.  - Presumed stable, advised to discuss with pulmonology. Refill today        I spent at least 10 minutes on this visit with this established patient. Subjective:   VV today for refill on cyclobenzaprine. Takes for muscle spasm mainly shoulders. Injury back in March of 2020. Was diagnosed with kyphosis by pulmonology. Has a history of sarcoidosis and needs refill on albuterol  Takes daily symbicort but feels albuterol helps more. Prior to Admission medications    Medication Sig Start Date End Date Taking? Authorizing Provider   cyclobenzaprine (FLEXERIL) 5 mg tablet Take 1 Tab by mouth two (2) times daily as needed for Muscle Spasm(s). 3/10/21  Yes Adri Barrow NP   albuterol (Proventil HFA) 90 mcg/actuation inhaler Take 2 Puffs by inhalation every four (4) hours as needed for Wheezing. 3/10/21  Yes Adri Barrow NP   busPIRone (BUSPAR) 7.5 mg tablet Take 1 Tab by mouth two (2) times a day. 2/26/21  Yes Adri Barrow NP   fluticasone propionate (FLONASE) 50 mcg/actuation nasal spray 2 Sprays by Both Nostrils route daily.  12/22/20  Yes Adri Barrow NP   Symbicort 160-4.5 mcg/actuation HFAA TAKE 2 PUFFS BY MOUTH TWICE A DAY (RINSE MOUTH AFTER USE) 9/11/20  Yes Provider, Historical   montelukast (SINGULAIR) 10 mg tablet  10/6/20  Yes Provider, Historical   diclofenac EC (VOLTAREN) 50 mg EC tablet Take 1 Tab by mouth two (2) times a day. 8/25/20  Yes Barrow, Jennifer Schooling, NP   valACYclovir (VALTREX) 500 mg tablet daily as needed. 6/22/20  Yes Provider, Historical   cetirizine HCl (ZYRTEC PO) Take  by mouth. Yes Provider, Historical     Patient Active Problem List   Diagnosis Code    Proximal phalanx fracture of finger S62.619A       Review of Systems   Constitutional: Negative for chills, fever and malaise/fatigue. Eyes: Negative for blurred vision. Respiratory: Negative for cough and shortness of breath. Cardiovascular: Negative for chest pain, palpitations and leg swelling. Musculoskeletal: Positive for myalgias. Neurological: Negative for dizziness and headaches.        Objective:     Patient-Reported Vitals 11/19/2020   Patient-Reported Weight 202lb   Patient-Reported Height -   Patient-Reported Pulse -   Patient-Reported Temperature -   Patient-Reported Systolic  -   Patient-Reported Diastolic -   Patient-Reported LMP this month        [INSTRUCTIONS:  \"[x]\" Indicates a positive item  \"[]\" Indicates a negative item  -- DELETE ALL ITEMS NOT EXAMINED]    Constitutional: [x] Appears well-developed and well-nourished [x] No apparent distress      [] Abnormal -     Mental status: [x] Alert and awake  [x] Oriented to person/place/time [x] Able to follow commands    [] Abnormal -     Eyes:   EOM    [x]  Normal    [] Abnormal -   Sclera  [x]  Normal    [] Abnormal -          Discharge [x]  None visible   [] Abnormal -     HENT: [x] Normocephalic, atraumatic  [] Abnormal -   [x] Mouth/Throat: Mucous membranes are moist    External Ears [x] Normal  [] Abnormal -    Neck: [x] No visualized mass [] Abnormal -     Pulmonary/Chest: [x] Respiratory effort normal   [x] No visualized signs of difficulty breathing or respiratory distress        [] Abnormal -      Musculoskeletal:   [x] Normal gait with no signs of ataxia         [x] Normal range of motion of neck        [] Abnormal -     Neurological:        [x] No Facial Asymmetry (Cranial nerve 7 motor function) (limited exam due to video visit)          [x] No gaze palsy        [] Abnormal -          Skin:        [x] No significant exanthematous lesions or discoloration noted on facial skin         [] Abnormal -            Psychiatric:       [x] Normal Affect [] Abnormal -        [x] No Hallucinations    Other pertinent observable physical exam findings:-        We discussed the expected course, resolution and complications of the diagnosis(es) in detail. Medication risks, benefits, costs, interactions, and alternatives were discussed as indicated. I advised her to contact the office if her condition worsens, changes or fails to improve as anticipated. She expressed understanding with the diagnosis(es) and plan. Shaq Grandchild, was evaluated through a synchronous (real-time) audio-video encounter. The patient (or guardian if applicable) is aware that this is a billable service. Verbal consent to proceed has been obtained within the past 12 months. The visit was conducted pursuant to the emergency declaration under the [address] [place] [address], [address] [place] [address] authority and the [employer] and [employer] General Act. Patient identification was verified, and a caregiver was present when appropriate. The patient was located in a state where the provider was credentialed to provide care.       Elvia Conrad NP

## 2021-03-19 ENCOUNTER — OFFICE VISIT (OUTPATIENT)
Dept: FAMILY MEDICINE CLINIC | Age: 36
End: 2021-03-19
Payer: COMMERCIAL

## 2021-03-19 VITALS
HEIGHT: 61 IN | RESPIRATION RATE: 16 BRPM | TEMPERATURE: 98.3 F | WEIGHT: 198.2 LBS | SYSTOLIC BLOOD PRESSURE: 97 MMHG | OXYGEN SATURATION: 98 % | HEART RATE: 88 BPM | BODY MASS INDEX: 37.42 KG/M2 | DIASTOLIC BLOOD PRESSURE: 65 MMHG

## 2021-03-19 DIAGNOSIS — R19.7 DIARRHEA, UNSPECIFIED TYPE: ICD-10-CM

## 2021-03-19 DIAGNOSIS — R11.10 VOMITING, INTRACTABILITY OF VOMITING NOT SPECIFIED, PRESENCE OF NAUSEA NOT SPECIFIED, UNSPECIFIED VOMITING TYPE: Primary | ICD-10-CM

## 2021-03-19 PROCEDURE — 99214 OFFICE O/P EST MOD 30 MIN: CPT | Performed by: NURSE PRACTITIONER

## 2021-03-19 RX ORDER — ONDANSETRON 4 MG/1
4 TABLET, ORALLY DISINTEGRATING ORAL
Qty: 30 TAB | Refills: 0 | Status: SHIPPED | OUTPATIENT
Start: 2021-03-19 | End: 2022-08-24 | Stop reason: ALTCHOICE

## 2021-03-19 NOTE — PROGRESS NOTES
Assessment/Plan:     Diagnoses and all orders for this visit:    1. Vomiting, intractability of vomiting not specified, presence of nausea not specified, unspecified vomiting type  -     XR ABD (KUB); Future  -     ondansetron (ZOFRAN ODT) 4 mg disintegrating tablet; Take 1 Tab by mouth every eight (8) hours as needed for Vomiting or Nausea or Vomiting.  - Unchanged, KUB today, zofran for n/v.  Rest bowels eating bland foods until GI symptoms resolve. Close follow up Monday, reasons to seek urgent care discussed. Severe abd pain, bloody stools, fever, vomiting blood. 2. Diarrhea, unspecified type            Discussed expected course/resolution/complications of diagnosis in detail with patient. Medication risks/benefits/costs/interactions/alternatives discussed with patient. Pt was given after visit summary which includes diagnoses, current medications & vitals. Pt expressed understanding with the diagnosis and plan        Subjective:      Maite Bryson is a 38 y.o. female who presents for had concerns including Vomiting (Patient states that she has been having symptoms for the past 3 days now) and Diarrhea. Nausea / Vomiting  Patient complains of nausea and vomiting. Onset of symptoms was 3 days. Patient describes nausea as mild. Vomiting has occurred 1 times over the past day. Vomitus is described as undigested food. Symptoms have been associated with with in the morning feels watery mouth and then vomits. Patient denies hematemesis, melena, fever, possibility of pregnancy, alcohol overuse. Course to date has been symptoms have progressed to a point and plateaued. .  Evaluation to date has been none. Treatment to date has been none. After eating sometimes will feel not below diaphram then vomits. States her baby had diarrhea last week. She ate hardees and a snickers on Wednesday night and vomiting the next morning.        Current Outpatient Medications   Medication Sig Dispense Refill    ondansetron (ZOFRAN ODT) 4 mg disintegrating tablet Take 1 Tab by mouth every eight (8) hours as needed for Vomiting or Nausea or Vomiting. 30 Tab 0    cyclobenzaprine (FLEXERIL) 5 mg tablet Take 1 Tab by mouth two (2) times daily as needed for Muscle Spasm(s). 60 Tab 1    albuterol (Proventil HFA) 90 mcg/actuation inhaler Take 2 Puffs by inhalation every four (4) hours as needed for Wheezing. 1 Inhaler 1    busPIRone (BUSPAR) 7.5 mg tablet Take 1 Tab by mouth two (2) times a day. 120 Tab 1    fluticasone propionate (FLONASE) 50 mcg/actuation nasal spray 2 Sprays by Both Nostrils route daily. 1 Bottle 1    Symbicort 160-4.5 mcg/actuation HFAA TAKE 2 PUFFS BY MOUTH TWICE A DAY (RINSE MOUTH AFTER USE)      diclofenac EC (VOLTAREN) 50 mg EC tablet Take 1 Tab by mouth two (2) times a day. (Patient taking differently: Take 50 mg by mouth as needed.) 30 Tab 1    valACYclovir (VALTREX) 500 mg tablet daily as needed.  cetirizine HCl (ZYRTEC PO) Take  by mouth.          Allergies   Allergen Reactions    Bee Pollen Swelling     Past Medical History:   Diagnosis Date    Asthma     bronchitis 1yr ago    Ill-defined condition     dislocated left jaw due to [place] Palsy    Neurological disorder     [place] Palsy    Proximal phalanx fracture of finger 12/30/2010     Past Surgical History:   Procedure Laterality Date    HX ORTHOPAEDIC       Family History   Problem Relation Age of Onset    Hypertension Mother     Asthma Mother     Hypertension Father     Diabetes Father     Diabetes Maternal Grandfather     Hypertension Maternal Grandfather     Heart Disease Maternal Grandfather     COPD Paternal Grandmother     Hypertension Paternal Grandmother     Diabetes Paternal Grandmother     Kidney Disease Paternal Grandmother      Social History     Socioeconomic History    Marital status: SINGLE     Spouse name: Not on file    Number of children: Not on file    Years of education: Not on file   Creston Sicard Highest education level: Not on file   Occupational History    Not on file   Social Needs    Financial resource strain: Not on file    Food insecurity     Worry: Not on file     Inability: Not on file    Transportation needs     Medical: Not on file     Non-medical: Not on file   Tobacco Use    Smoking status: Former Smoker     Quit date: 6/3/2014     Years since quittin.7    Smokeless tobacco: Former User   Substance and Sexual Activity    Alcohol use: Yes     Comment: socially    Drug use: No    Sexual activity: Yes     Partners: Male     Birth control/protection: None, Condom   Lifestyle    Physical activity     Days per week: Not on file     Minutes per session: Not on file    Stress: Not on file   Relationships    Social connections     Talks on phone: Not on file     Gets together: Not on file     Attends Yarsani service: Not on file     Active member of club or organization: Not on file     Attends meetings of clubs or organizations: Not on file     Relationship status: Not on file    Intimate partner violence     Fear of current or ex partner: Not on file     Emotionally abused: Not on file     Physically abused: Not on file     Forced sexual activity: Not on file   Other Topics Concern    Not on file   Social History Narrative    Not on file       HPI      ROS:   Review of Systems   Constitutional: Negative for chills, fever and malaise/fatigue. Eyes: Negative for blurred vision. Respiratory: Negative for cough and shortness of breath. Cardiovascular: Negative for chest pain, palpitations and leg swelling. Gastrointestinal: Positive for diarrhea and vomiting. Negative for abdominal pain, blood in stool, constipation, heartburn, melena and nausea. Neurological: Negative for dizziness and headaches.        Objective:     Visit Vitals  BP 97/65 (BP 1 Location: Right upper arm, BP Patient Position: Sitting, BP Cuff Size: Adult)   Pulse 88   Temp 98.3 °F (36.8 °C) (Oral)   Resp 16   Ht 5' 1\" (1.549 m)   Wt 198 lb 3.2 oz (89.9 kg)   LMP 02/21/2021 (Exact Date)   SpO2 98%   BMI 37.45 kg/m²         Vitals and Nurse Documentation reviewed. Physical Exam  Constitutional:       General: She is not in acute distress. Appearance: She is not diaphoretic. HENT:      Head: Normocephalic and atraumatic. Cardiovascular:      Rate and Rhythm: Normal rate and regular rhythm. Heart sounds: Normal heart sounds. No murmur. No friction rub. No gallop. Pulmonary:      Effort: Pulmonary effort is normal. No respiratory distress. Breath sounds: Normal breath sounds. No wheezing or rales. Abdominal:      General: Bowel sounds are normal.      Palpations: Abdomen is soft. There is no hepatomegaly or splenomegaly. Tenderness: There is no abdominal tenderness. There is no right CVA tenderness, left CVA tenderness or guarding. Negative signs include McBurney's sign. Skin:     General: Skin is warm and dry. Coloration: Skin is not pale. Neurological:      Mental Status: She is alert and oriented to person, place, and time. Psychiatric:         Mood and Affect: Affect normal. Mood is not anxious or depressed. Behavior: Behavior is not agitated. Thought Content: Thought content does not include homicidal or suicidal ideation.          Results for orders placed or performed during the hospital encounter of 09/08/20   HCG URINE, QL. - POC   Result Value Ref Range    Pregnancy test,urine (POC) Negative NEG

## 2021-03-19 NOTE — PROGRESS NOTES
Channing Arreaga is a 38 y.o. female     Chief Complaint   Patient presents with    Vomiting     Patient states that she has been having symptoms for the past 3 days now    Diarrhea     1. Have you been to the ER, urgent care clinic since your last visit? Hospitalized since your last visit? No    2. Have you seen or consulted any other health care providers outside of the [address] [place] [address] since your last visit? Include any pap smears or colon screening.  No     Visit Vitals  Ht 5' 1\" (1.549 m)   Wt 198 lb 3.2 oz (89.9 kg)   BMI 37.45 kg/m²

## 2021-04-19 RX ORDER — FLUTICASONE PROPIONATE 50 MCG
SPRAY, SUSPENSION (ML) NASAL
Qty: 1 BOTTLE | Refills: 1 | Status: SHIPPED | OUTPATIENT
Start: 2021-04-19 | End: 2021-12-01

## 2021-05-03 DIAGNOSIS — D86.9 SARCOIDOSIS: Primary | ICD-10-CM

## 2021-05-03 RX ORDER — ALBUTEROL SULFATE 0.83 MG/ML
2.5 SOLUTION RESPIRATORY (INHALATION)
Qty: 30 NEBULE | Refills: 2 | Status: SHIPPED | OUTPATIENT
Start: 2021-05-03

## 2021-06-08 DIAGNOSIS — S46.811D STRAIN OF RIGHT TRAPEZIUS MUSCLE, SUBSEQUENT ENCOUNTER: ICD-10-CM

## 2021-06-09 RX ORDER — CYCLOBENZAPRINE HCL 5 MG
TABLET ORAL
Qty: 60 TABLET | Refills: 1 | Status: SHIPPED | OUTPATIENT
Start: 2021-06-09 | End: 2021-08-23 | Stop reason: SDUPTHER

## 2021-06-21 ENCOUNTER — OFFICE VISIT (OUTPATIENT)
Dept: FAMILY MEDICINE CLINIC | Age: 36
End: 2021-06-21
Payer: COMMERCIAL

## 2021-06-21 VITALS
OXYGEN SATURATION: 98 % | BODY MASS INDEX: 37.38 KG/M2 | SYSTOLIC BLOOD PRESSURE: 100 MMHG | RESPIRATION RATE: 16 BRPM | WEIGHT: 198 LBS | HEIGHT: 61 IN | TEMPERATURE: 98.2 F | DIASTOLIC BLOOD PRESSURE: 68 MMHG | HEART RATE: 80 BPM

## 2021-06-21 DIAGNOSIS — J01.10 ACUTE NON-RECURRENT FRONTAL SINUSITIS: Primary | ICD-10-CM

## 2021-06-21 PROCEDURE — 99213 OFFICE O/P EST LOW 20 MIN: CPT | Performed by: NURSE PRACTITIONER

## 2021-06-21 RX ORDER — AMOXICILLIN AND CLAVULANATE POTASSIUM 875; 125 MG/1; MG/1
1 TABLET, FILM COATED ORAL 2 TIMES DAILY
Qty: 20 TABLET | Refills: 0 | Status: SHIPPED | OUTPATIENT
Start: 2021-06-21 | End: 2021-07-01

## 2021-06-21 NOTE — LETTER
NOTIFICATION RETURN TO WORK / SCHOOL    6/21/2021 10:42 AM    Ms. Josianemarcchristopher [phone]      To Whom It May Concern:    Laxmi Dejesus is currently under the care of [address] [facility]. She will return to work/school on: Please excuse on 6/21 and 6/22    If there are questions or concerns please have the patient contact our office.         Sincerely,      Sha Delgado NP

## 2021-06-21 NOTE — PROGRESS NOTES
Identified pt with two pt identifiers(name and ). Reviewed record in preparation for visit and have obtained necessary documentation. Chief Complaint   Patient presents with    Sinus Infection     For x4 day; experiencing ear ache, pressure behind eyes and nose, dark green mucous        Health Maintenance Due   Topic    Hepatitis C Screening     DTaP/Tdap/Td series (1 - Tdap)    PAP AKA CERVICAL CYTOLOGY        Coordination of Care Questionnaire:  :   1) Have you been to an emergency room, urgent care, or hospitalized since your last visit? If yes, where when, and reason for visit? no    2. Have seen or consulted any other health care provider since your last visit? If yes, where when, and reason for visit?  no        Patient is accompanied by self I have received verbal consent from Zoila Groves to discuss any/all medical information while they are present in the room.

## 2021-06-21 NOTE — PATIENT INSTRUCTIONS
Take medication as prescribed, use Flonase  And mucinex  for relief. Tylenol for pain. Sinusitis: Care Instructions Your Care Instructions Sinusitis is an infection of the lining of the sinus cavities in your head. Sinusitis often follows a cold. It causes pain and pressure in your head and face. In most cases, sinusitis gets better on its own in 1 to 2 weeks. But some mild symptoms may last for several weeks. Sometimes antibiotics are needed. Follow-up care is a key part of your treatment and safety. Be sure to make and go to all appointments, and call your doctor if you are having problems. It's also a good idea to know your test results and keep a list of the medicines you take. How can you care for yourself at home? · Take an over-the-counter pain medicine, such as acetaminophen (Tylenol), ibuprofen (Advil, Motrin), or naproxen (Aleve). Read and follow all instructions on the label. · If the doctor prescribed antibiotics, take them as directed. Do not stop taking them just because you feel better. You need to take the full course of antibiotics. · Be careful when taking over-the-counter cold or flu medicines and Tylenol at the same time. Many of these medicines have acetaminophen, which is Tylenol. Read the labels to make sure that you are not taking more than the recommended dose. Too much acetaminophen (Tylenol) can be harmful. · Breathe warm, moist air from a steamy shower, a hot bath, or a sink filled with hot water. Avoid cold, dry air. Using a humidifier in your home may help. Follow the directions for cleaning the machine. · Use saline (saltwater) nasal washes. This can help keep your nasal passages open and wash out mucus and bacteria. You can buy saline nose drops at a grocery store or drugstore. Or you can make your own at home by adding 1 teaspoon of salt and 1 teaspoon of baking soda to 2 cups of distilled water.  If you make your own, fill a bulb syringe with the solution, insert the tip into your nostril, and squeeze gently. Seferino Si your nose. · Put a hot, wet towel or a warm gel pack on your face 3 or 4 times a day for 5 to 10 minutes each time. · Try a decongestant nasal spray like oxymetazoline (Afrin). Do not use it for more than 3 days in a row. Using it for more than 3 days can make your congestion worse. When should you call for help? Call your doctor now or seek immediate medical care if: 
  · You have new or worse swelling or redness in your face or around your eyes.  
  · You have a new or higher fever. Watch closely for changes in your health, and be sure to contact your doctor if: 
  · You have new or worse facial pain.  
  · The mucus from your nose becomes thicker (like pus) or has new blood in it.  
  · You are not getting better as expected. Where can you learn more? Go to http://www.gray.com/ Enter K543 in the search box to learn more about \"Sinusitis: Care Instructions. \" Current as of: December 2, 2020               Content Version: 12.8 © 4640-8205 [employer]. Care instructions adapted under license by [employer] (which disclaims liability or warranty for this information). If you have questions about a medical condition or this instruction, always ask your healthcare professional. [address] any warranty or liability for your use of this information.

## 2021-06-21 NOTE — PROGRESS NOTES
Nat Valdez is a 43 y.o. female who presents to clinic today for the following:    Chief Complaint   Patient presents with    Sinus Infection     For x4 day; experiencing ear ache, pressure behind eyes and nose, dark green mucous       Vitals:    06/21/21 0944   BP: 100/68   Pulse: 80   Resp: 16   Temp: 98.2 °F (36.8 °C)   TempSrc: Temporal   SpO2: 98%   Weight: 198 lb (89.8 kg)   Height: 5' 1\" (1.549 m)       Body mass index is 37.41 kg/m². Patients past medical, surgical and family histories were reviewed. Allergies and Medications reviewed and updated. Current Outpatient Medications:     amoxicillin-clavulanate (AUGMENTIN) 875-125 mg per tablet, Take 1 Tablet by mouth two (2) times a day for 10 days. Indications: acute bacterial infection of the sinuses, Disp: 20 Tablet, Rfl: 0    cyclobenzaprine (FLEXERIL) 5 mg tablet, TAKE 1 TABLET BY MOUTH 2 TIMES A DAY AS NEEDED FOR MUSCLE SPASMS, Disp: 60 Tablet, Rfl: 1    albuterol (PROVENTIL VENTOLIN) 2.5 mg /3 mL (0.083 %) nebu, 3 mL by Nebulization route every four (4) hours as needed for Wheezing., Disp: 30 Nebule, Rfl: 2    fluticasone propionate (FLONASE) 50 mcg/actuation nasal spray, USE 2 SPRAYS IN EACH NOSTRIL DAILY, Disp: 1 Bottle, Rfl: 1    ondansetron (ZOFRAN ODT) 4 mg disintegrating tablet, Take 1 Tab by mouth every eight (8) hours as needed for Vomiting or Nausea or Vomiting., Disp: 30 Tab, Rfl: 0    albuterol (Proventil HFA) 90 mcg/actuation inhaler, Take 2 Puffs by inhalation every four (4) hours as needed for Wheezing., Disp: 1 Inhaler, Rfl: 1    busPIRone (BUSPAR) 7.5 mg tablet, Take 1 Tab by mouth two (2) times a day., Disp: 120 Tab, Rfl: 1    Symbicort 160-4.5 mcg/actuation HFAA, TAKE 2 PUFFS BY MOUTH TWICE A DAY (RINSE MOUTH AFTER USE), Disp: , Rfl:     valACYclovir (VALTREX) 500 mg tablet, daily as needed. , Disp: , Rfl:     cetirizine HCl (ZYRTEC PO), Take  by mouth., Disp: , Rfl:     Allergies   Allergen Reactions    Bee Pollen Swelling       Past Medical History:   Diagnosis Date    Asthma     bronchitis 1yr ago    Ill-defined condition     dislocated left jaw due to [place] Palsy    Neurological disorder     [place] Palsy    Proximal phalanx fracture of finger 2010       Past Surgical History:   Procedure Laterality Date    HX ORTHOPAEDIC         Family History   Problem Relation Age of Onset    Hypertension Mother     Asthma Mother     Hypertension Father     Diabetes Father     Diabetes Maternal Grandfather     Hypertension Maternal Grandfather     Heart Disease Maternal Grandfather     COPD Paternal Grandmother     Hypertension Paternal Grandmother     Diabetes Paternal Grandmother     Kidney Disease Paternal Grandmother        Social History     Socioeconomic History    Marital status: SINGLE     Spouse name: Not on file    Number of children: Not on file    Years of education: Not on file    Highest education level: Not on file   Occupational History    Not on file   Tobacco Use    Smoking status: Former Smoker     Quit date: 6/3/2014     Years since quittin.0    Smokeless tobacco: Former User   Vaping Use    Vaping Use: Never used   Substance and Sexual Activity    Alcohol use: Yes     Comment: socially    Drug use: No    Sexual activity: Yes     Partners: Male     Birth control/protection: None, Condom   Other Topics Concern    Not on file   Social History Narrative    Not on file     Social Determinants of Health     Financial Resource Strain:     Difficulty of Paying Living Expenses:    Food Insecurity:     Worried About Running Out of Food in the Last Year:     [address] in the Last Year:    Transportation Needs:     Lack of Transportation (Medical):      Lack of Transportation (Non-Medical):    Physical Activity:     Days of Exercise per Week:     Minutes of Exercise per Session:    Stress:     Feeling of Stress :    Social Connections:     Frequency of Communication with Friends and Family:     Frequency of Social Gatherings with Friends and Family:     Attends Confucianist Services:     Active Member of Clubs or Organizations:     Attends Club or Organization Meetings:     Marital Status:    Intimate Partner Violence:     Fear of Current or Ex-Partner:     Emotionally Abused:     Physically Abused:     Sexually Abused:            Physical Exam  Vitals and nursing note reviewed. HENT:      Head: Normocephalic. Right Ear: Tympanic membrane normal.      Left Ear: Tympanic membrane normal.      Nose:      Comments: congestion     Mouth/Throat:      Mouth: Mucous membranes are moist.   Eyes:      Pupils: Pupils are equal, round, and reactive to light. Cardiovascular:      Rate and Rhythm: Normal rate and regular rhythm. Pulses: Normal pulses. Heart sounds: Normal heart sounds. Pulmonary:      Effort: Pulmonary effort is normal.      Breath sounds: Normal breath sounds. Abdominal:      General: Abdomen is flat. Musculoskeletal:      Cervical back: Normal range of motion. Skin:     General: Skin is warm and dry. Neurological:      General: No focal deficit present. Mental Status: She is alert and oriented to person, place, and time. Psychiatric:         Mood and Affect: Mood normal.          Patient was seen in office with complaint of sinus pain and congestion. This has been ongoing since Friday. Patient's tried over-the-counter medication without relief. Patient does have some clear fluid in both tympanic membranes as well as swollen turbinates. Patient does have productive cough however has clear lung sounds. She does report postnasal drip. Antibiotic has been prescribed as well as recommendation for Mucinex and continue using Flonase. I have asked her to follow-up if symptoms continue after antibiotics. Advised her to take probiotic when taking the antibiotic to lessen GI side effect.         Review of Systems   Constitutional: Negative for fever and malaise/fatigue. HENT: Positive for congestion and sinus pain. Negative for sore throat. Eyes: Negative. Respiratory: Positive for cough. Negative for shortness of breath. Cardiovascular: Negative for chest pain. Gastrointestinal: Negative for diarrhea, nausea and vomiting. Genitourinary: Negative for dysuria. Musculoskeletal: Negative. Skin: Negative. Neurological: Negative for dizziness and headaches. Endo/Heme/Allergies: Negative for environmental allergies. Psychiatric/Behavioral: Negative. No results found. No results found for this or any previous visit (from the past 24 hour(s)). Assessment and Plan:    Encounter Diagnoses   Name Primary?  Acute non-recurrent frontal sinusitis Yes                I have discussed the diagnosis with the patient and the intended plan as seen in the above orders. she has expressed understanding. The patient has received an after-visit summary and questions were answered concerning future plans. I have discussed medication side effects and warnings with the patient as well. Follow-up and Dispositions    · Return if symptoms worsen or fail to improve.          Electronically Signed: Екатерина Johnson NP

## 2021-08-02 ENCOUNTER — OFFICE VISIT (OUTPATIENT)
Dept: FAMILY MEDICINE CLINIC | Age: 36
End: 2021-08-02
Payer: COMMERCIAL

## 2021-08-02 DIAGNOSIS — F41.9 ANXIETY: ICD-10-CM

## 2021-08-02 PROCEDURE — 99213 OFFICE O/P EST LOW 20 MIN: CPT | Performed by: NURSE PRACTITIONER

## 2021-08-02 RX ORDER — BUSPIRONE HYDROCHLORIDE 10 MG/1
10 TABLET ORAL 2 TIMES DAILY
Qty: 120 TABLET | Refills: 1 | Status: SHIPPED | OUTPATIENT
Start: 2021-08-02 | End: 2021-10-22 | Stop reason: SDUPTHER

## 2021-08-02 NOTE — PROGRESS NOTES
Cindy Varner is a 43 y.o. female who was seen by synchronous (real-time) audio-video technology on 8/2/2021 for No chief complaint on file. Assessment & Plan:   Diagnoses and all orders for this visit:    1. Anxiety  -     busPIRone (BUSPAR) 10 mg tablet; Take 1 Tablet by mouth two (2) times a day. - Worsening, increase Buspar today as directed to 10mg. Follow up in 3-4 weeks. Seek urgent care for SI/HI. I spent at least 10 minutes on this visit with this established patient. Subjective:   Here today for medication evaluation. States anxiety and agitation have gotten worse. Would like to increase the Buspar dosage. Denies SI/HI at this time. Prior to Admission medications    Medication Sig Start Date End Date Taking? Authorizing Provider   busPIRone (BUSPAR) 10 mg tablet Take 1 Tablet by mouth two (2) times a day. 8/2/21  Yes Rosemarie Barrow NP   cyclobenzaprine (FLEXERIL) 5 mg tablet TAKE 1 TABLET BY MOUTH 2 TIMES A DAY AS NEEDED FOR MUSCLE SPASMS 6/9/21  Yes Rosemarie Barrow NP   albuterol (PROVENTIL VENTOLIN) 2.5 mg /3 mL (0.083 %) nebu 3 mL by Nebulization route every four (4) hours as needed for Wheezing. 5/3/21  Yes Rosemarie Barrow NP   fluticasone propionate (FLONASE) 50 mcg/actuation nasal spray USE 2 SPRAYS IN EACH NOSTRIL DAILY 4/19/21  Yes Rosemarie Barrow NP   ondansetron (ZOFRAN ODT) 4 mg disintegrating tablet Take 1 Tab by mouth every eight (8) hours as needed for Vomiting or Nausea or Vomiting. 3/19/21  Yes Rosemarie Barrow NP   albuterol (Proventil HFA) 90 mcg/actuation inhaler Take 2 Puffs by inhalation every four (4) hours as needed for Wheezing. 3/10/21  Yes Rosemarie Barrow NP   Symbicort 160-4.5 mcg/actuation HFAA TAKE 2 PUFFS BY MOUTH TWICE A DAY (RINSE MOUTH AFTER USE) 9/11/20  Yes Provider, Historical   valACYclovir (VALTREX) 500 mg tablet daily as needed. 6/22/20  Yes Provider, Historical   cetirizine HCl (ZYRTEC PO) Take  by mouth.    Yes Provider, Historical     Patient Active Problem List   Diagnosis Code    Proximal phalanx fracture of finger S62.619A       Review of Systems   Constitutional: Negative for chills, fever and malaise/fatigue. Eyes: Negative for blurred vision. Respiratory: Negative for cough and shortness of breath. Cardiovascular: Negative for chest pain, palpitations and leg swelling. Neurological: Negative for dizziness and headaches. Psychiatric/Behavioral: Negative for depression, hallucinations, memory loss, substance abuse and suicidal ideas. The patient is nervous/anxious. The patient does not have insomnia.         Objective:     Patient-Reported Vitals 11/19/2020   Patient-Reported Weight 202lb   Patient-Reported Height -   Patient-Reported Pulse -   Patient-Reported Temperature -   Patient-Reported Systolic  -   Patient-Reported Diastolic -   Patient-Reported LMP this month        [INSTRUCTIONS:  \"[x]\" Indicates a positive item  \"[]\" Indicates a negative item  -- DELETE ALL ITEMS NOT EXAMINED]    Constitutional: [x] Appears well-developed and well-nourished [x] No apparent distress      [] Abnormal -     Mental status: [x] Alert and awake  [x] Oriented to person/place/time [x] Able to follow commands    [] Abnormal -     Eyes:   EOM    [x]  Normal    [] Abnormal -   Sclera  [x]  Normal    [] Abnormal -          Discharge [x]  None visible   [] Abnormal -     HENT: [x] Normocephalic, atraumatic  [] Abnormal -   [x] Mouth/Throat: Mucous membranes are moist    External Ears [x] Normal  [] Abnormal -    Neck: [x] No visualized mass [] Abnormal -     Pulmonary/Chest: [x] Respiratory effort normal   [x] No visualized signs of difficulty breathing or respiratory distress        [] Abnormal -      Musculoskeletal:   [x] Normal gait with no signs of ataxia         [x] Normal range of motion of neck        [] Abnormal -     Neurological:        [x] No Facial Asymmetry (Cranial nerve 7 motor function) (limited exam due to video visit)          [x] No gaze palsy        [] Abnormal -          Skin:        [x] No significant exanthematous lesions or discoloration noted on facial skin         [] Abnormal -            Psychiatric:       [x] Normal Affect [] Abnormal -        [x] No Hallucinations    Other pertinent observable physical exam findings:-        We discussed the expected course, resolution and complications of the diagnosis(es) in detail. Medication risks, benefits, costs, interactions, and alternatives were discussed as indicated. I advised her to contact the office if her condition worsens, changes or fails to improve as anticipated. She expressed understanding with the diagnosis(es) and plan. Tania Rdz, was evaluated through a synchronous (real-time) audio-video encounter. The patient (or guardian if applicable) is aware that this is a billable service. Verbal consent to proceed has been obtained within the past 12 months. The visit was conducted pursuant to the emergency declaration under the [address] [place] [address] authority and the [employer] and [employer] General Act. Patient identification was verified, and a caregiver was present when appropriate. The patient was located in a state where the provider was credentialed to provide care.       Chikis Yin NP

## 2021-08-23 DIAGNOSIS — S46.811D STRAIN OF RIGHT TRAPEZIUS MUSCLE, SUBSEQUENT ENCOUNTER: ICD-10-CM

## 2021-08-24 RX ORDER — CYCLOBENZAPRINE HCL 5 MG
TABLET ORAL
Qty: 60 TABLET | Refills: 1 | Status: SHIPPED | OUTPATIENT
Start: 2021-08-24 | End: 2021-10-05 | Stop reason: SDUPTHER

## 2021-09-02 ENCOUNTER — OFFICE VISIT (OUTPATIENT)
Dept: FAMILY MEDICINE CLINIC | Age: 36
End: 2021-09-02
Payer: COMMERCIAL

## 2021-09-02 VITALS
TEMPERATURE: 98.1 F | SYSTOLIC BLOOD PRESSURE: 109 MMHG | OXYGEN SATURATION: 98 % | HEART RATE: 88 BPM | HEIGHT: 66 IN | BODY MASS INDEX: 33.43 KG/M2 | DIASTOLIC BLOOD PRESSURE: 75 MMHG | RESPIRATION RATE: 16 BRPM | WEIGHT: 208 LBS

## 2021-09-02 DIAGNOSIS — B08.4 HAND, FOOT AND MOUTH DISEASE (HFMD): Primary | ICD-10-CM

## 2021-09-02 PROCEDURE — 99213 OFFICE O/P EST LOW 20 MIN: CPT | Performed by: FAMILY MEDICINE

## 2021-09-02 NOTE — PROGRESS NOTES
Chief Complaint   Patient presents with    Rash     Patient states daughter had hand foot and mouth 2 weeks ago. Patient states yesterday symptoms started feeling like she was walking on glass. Patient mostly occurs when walking. Patient reports rash on feet, hands, and face. Patient states using topical cream to help with itching. Patient states also taking tylenol. she is a 43y.o. year old female who presents for evalution. Daughter has hand foot and mouth disease  She has bumps on hands , face and feet  These appeared this morning    Reviewed PmHx, RxHx, FmHx, SocHx, AllgHx and updated and dated in the chart. Aspirin yes ____   No____ N/A____    Patient Active Problem List    Diagnosis    Proximal phalanx fracture of finger       Nurse notes were reviewed and copied and are correct  Review of Systems - negative except as listed above in the HPI    Objective:     Vitals:    09/02/21 0834   BP: 109/75   Pulse: 88   Resp: 16   Temp: 98.1 °F (36.7 °C)   TempSrc: Skin   SpO2: 98%   Weight: 208 lb (94.3 kg)   Height: 5' 6\" (1.676 m)       Skin: small papule on the face, and hands, tenderness on the plantar surface on feet  No sores in mouth yet    Assessment/ Plan:   Diagnoses and all orders for this visit:    1. Hand, foot and mouth disease (HFMD)       given a note to stay out of work until next Tuesday  She is in the early time of outbreak and I think she is contagious to co-workers sitting near her and using her phones etc  Use tylenol prn  No antibiotics indicated      ICD-10-CM ICD-9-CM    1. Hand, foot and mouth disease (HFMD)  B08.4 074.3        I have discussed the diagnosis with the patient and the intended plan as seen in the above orders. The patient has received an after-visit summary and questions were answered concerning future plans. There are no Patient Instructions on file for this visit.     The patient verbalizes understanding and agrees with the plan of care

## 2021-09-02 NOTE — LETTER
NOTIFICATION RETURN TO WORK / SCHOOL    9/2/2021 8:56 AM    [phone]      To Whom It May Concern:    Cindy Varner is currently under the care of [address] [facility]. She will return to work/school on: Tuesday September 7, 2021     If there are questions or concerns please have the patient contact our office.         Sincerely,      Kelvin Lazaro MD

## 2021-09-02 NOTE — PROGRESS NOTES
1. Have you been to the ER, urgent care clinic since your last visit? Hospitalized since your last visit? No    2. Have you seen or consulted any other health care providers outside of the [address] [place] [address] since your last visit? Include any pap smears or colon screening. No    Chief Complaint   Patient presents with    Rash     Patient states daughter had hand foot and mouth 2 weeks ago. Patient states yesterday symptoms started feeling like she was walking on glass. Patient mostly occurs when walking. Patient reports rash on feet, hands, and face. Patient states using topical cream to help with itching. Patient states also taking tylenol. Visit Vitals  /75 (BP 1 Location: Left arm, BP Patient Position: Sitting, BP Cuff Size: Adult)   Pulse 88   Temp 98.1 °F (36.7 °C) (Skin)   Resp 16   Ht 5' 6\" (1.676 m)   Wt 208 lb (94.3 kg)   SpO2 98%   BMI 33.57 kg/m²       3 most recent PHQ Screens 9/2/2021   Little interest or pleasure in doing things Not at all   Feeling down, depressed, irritable, or hopeless Not at all   Total Score PHQ 2 0     Abuse Screening Questionnaire 9/2/2021   Do you ever feel afraid of your partner? N   Are you in a relationship with someone who physically or mentally threatens you? N   Is it safe for you to go home?  Y     Learning Assessment 5/28/2014   PRIMARY LEARNER Patient   BARRIERS PRIMARY LEARNER NONE   CO-LEARNER CAREGIVER No   PRIMARY LANGUAGE ENGLISH    NEED No   LEARNER PREFERENCE PRIMARY DEMONSTRATION   LEARNING SPECIAL TOPICS None   ANSWERED BY self   RELATIONSHIP SELF

## 2021-10-05 DIAGNOSIS — S46.811D STRAIN OF RIGHT TRAPEZIUS MUSCLE, SUBSEQUENT ENCOUNTER: ICD-10-CM

## 2021-10-05 RX ORDER — CYCLOBENZAPRINE HCL 5 MG
TABLET ORAL
Qty: 60 TABLET | Refills: 1 | Status: SHIPPED | OUTPATIENT
Start: 2021-10-05 | End: 2021-12-15

## 2021-10-22 ENCOUNTER — TELEPHONE (OUTPATIENT)
Dept: FAMILY MEDICINE CLINIC | Age: 36
End: 2021-10-22

## 2021-10-22 DIAGNOSIS — F41.9 ANXIETY: ICD-10-CM

## 2021-10-22 RX ORDER — BUSPIRONE HYDROCHLORIDE 10 MG/1
10 TABLET ORAL 2 TIMES DAILY
Qty: 180 TABLET | Refills: 1 | Status: SHIPPED | OUTPATIENT
Start: 2021-10-22 | End: 2021-12-16 | Stop reason: SDUPTHER

## 2021-12-01 RX ORDER — FLUTICASONE PROPIONATE 50 MCG
SPRAY, SUSPENSION (ML) NASAL
Qty: 16 G | Refills: 1 | Status: SHIPPED | OUTPATIENT
Start: 2021-12-01 | End: 2021-12-15

## 2021-12-14 DIAGNOSIS — S46.811D STRAIN OF RIGHT TRAPEZIUS MUSCLE, SUBSEQUENT ENCOUNTER: ICD-10-CM

## 2021-12-15 RX ORDER — FLUTICASONE PROPIONATE 50 MCG
SPRAY, SUSPENSION (ML) NASAL
Qty: 16 G | Refills: 1 | Status: SHIPPED | OUTPATIENT
Start: 2021-12-15 | End: 2022-06-09 | Stop reason: SDUPTHER

## 2021-12-15 RX ORDER — CYCLOBENZAPRINE HCL 5 MG
TABLET ORAL
Qty: 60 TABLET | Refills: 1 | Status: SHIPPED | OUTPATIENT
Start: 2021-12-15 | End: 2022-06-09 | Stop reason: SDUPTHER

## 2021-12-16 DIAGNOSIS — F41.9 ANXIETY: ICD-10-CM

## 2021-12-16 RX ORDER — BUSPIRONE HYDROCHLORIDE 15 MG/1
15 TABLET ORAL 2 TIMES DAILY
Qty: 180 TABLET | Refills: 0 | Status: SHIPPED | OUTPATIENT
Start: 2021-12-16 | End: 2022-06-09 | Stop reason: SDUPTHER

## 2022-06-09 ENCOUNTER — VIRTUAL VISIT (OUTPATIENT)
Dept: FAMILY MEDICINE CLINIC | Age: 37
End: 2022-06-09
Payer: COMMERCIAL

## 2022-06-09 DIAGNOSIS — F41.9 ANXIETY: ICD-10-CM

## 2022-06-09 DIAGNOSIS — J30.1 SEASONAL ALLERGIC RHINITIS DUE TO POLLEN: Primary | ICD-10-CM

## 2022-06-09 DIAGNOSIS — S46.811D STRAIN OF RIGHT TRAPEZIUS MUSCLE, SUBSEQUENT ENCOUNTER: ICD-10-CM

## 2022-06-09 PROCEDURE — 99213 OFFICE O/P EST LOW 20 MIN: CPT | Performed by: NURSE PRACTITIONER

## 2022-06-09 RX ORDER — CYCLOBENZAPRINE HCL 5 MG
5 TABLET ORAL
Qty: 60 TABLET | Refills: 2 | Status: SHIPPED | OUTPATIENT
Start: 2022-06-09

## 2022-06-09 RX ORDER — BUSPIRONE HYDROCHLORIDE 15 MG/1
15 TABLET ORAL 2 TIMES DAILY
Qty: 180 TABLET | Refills: 1 | Status: SHIPPED | OUTPATIENT
Start: 2022-06-09

## 2022-06-09 RX ORDER — FLUTICASONE PROPIONATE 50 MCG
SPRAY, SUSPENSION (ML) NASAL
Qty: 16 G | Refills: 2 | Status: SHIPPED | OUTPATIENT
Start: 2022-06-09

## 2022-06-09 NOTE — PROGRESS NOTES
Kayleen Mcdonald is a 42 y.o. female who was seen by synchronous (real-time) audio-video technology on 6/9/2022 for No chief complaint on file. Assessment & Plan:   Diagnoses and all orders for this visit:    1. Seasonal allergic rhinitis due to pollen  -     fluticasone propionate (FLONASE) 50 mcg/actuation nasal spray; USE 2 SPRAYS IN EACH NOSTRIL DAILY  - Worsening, start back on flonase. Follow up if symptoms persist    2. Anxiety  -     busPIRone (BUSPAR) 15 mg tablet; Take 1 Tablet by mouth two (2) times a day. - Stable, continue current therapy, refill today    3. Strain of right trapezius muscle, subsequent encounter  -     cyclobenzaprine (FLEXERIL) 5 mg tablet; Take 1 Tablet by mouth three (3) times daily as needed for Muscle Spasm(s). - Stable, continue current therapy, refill today, side effects discussed        I spent at least 6 minutes on this visit with this established patient. Subjective:   VV today for follow up on anxiety. States stable on current therapy and dose. Needs refill of flonase. States  Seasonal allergies have really been bad the last couple of weeks  Sees pulmonology for sarcoidosis management    Prior to Admission medications    Medication Sig Start Date End Date Taking? Authorizing Provider   busPIRone (BUSPAR) 15 mg tablet Take 1 Tablet by mouth two (2) times a day. 12/16/21   Carlyle Barrow NP   cyclobenzaprine (FLEXERIL) 5 mg tablet TAKE 1 TABLET BY MOUTH 2 TIMES A DAY AS NEEDED FOR MUSCLE SPASMS 12/15/21   Carlyle Barrow NP   fluticasone propionate (FLONASE) 50 mcg/actuation nasal spray USE 2 SPRAYS IN EACH NOSTRIL DAILY 12/15/21   Carlyle Barrow NP   albuterol (PROVENTIL VENTOLIN) 2.5 mg /3 mL (0.083 %) nebu 3 mL by Nebulization route every four (4) hours as needed for Wheezing.  5/3/21   Carlyle Barrow NP   ondansetron (ZOFRAN ODT) 4 mg disintegrating tablet Take 1 Tab by mouth every eight (8) hours as needed for Vomiting or Nausea or Vomiting. 3/19/21   Rosemarie Barrow, NP   albuterol (Proventil HFA) 90 mcg/actuation inhaler Take 2 Puffs by inhalation every four (4) hours as needed for Wheezing. 3/10/21   Rosemarie Barrow NP   Symbicort 160-4.5 mcg/actuation HFAA TAKE 2 PUFFS BY MOUTH TWICE A DAY (RINSE MOUTH AFTER USE) 9/11/20   Provider, Historical   valACYclovir (VALTREX) 500 mg tablet daily as needed. 6/22/20   Provider, Historical   cetirizine HCl (ZYRTEC PO) Take  by mouth. Provider, Historical     Patient Active Problem List   Diagnosis Code    Proximal phalanx fracture of finger S62.619A       Review of Systems   Constitutional: Negative for chills, fever and malaise/fatigue. HENT: Positive for congestion. Eyes: Negative for blurred vision. Respiratory: Negative for cough and shortness of breath. Cardiovascular: Negative for chest pain, palpitations and leg swelling. Musculoskeletal: Positive for myalgias. Neurological: Negative for dizziness and headaches. Psychiatric/Behavioral: The patient is nervous/anxious.         Objective:     Patient-Reported Vitals 6/6/2022   Patient-Reported Weight 212   Patient-Reported Height -   Patient-Reported Pulse -   Patient-Reported Temperature -   Patient-Reported Systolic  -   Patient-Reported Diastolic -   Patient-Reported LMP 06/02/2022        [INSTRUCTIONS:  \"[x]\" Indicates a positive item  \"[]\" Indicates a negative item  -- DELETE ALL ITEMS NOT EXAMINED]    Constitutional: [x] Appears well-developed and well-nourished [x] No apparent distress      [] Abnormal -     Mental status: [x] Alert and awake  [x] Oriented to person/place/time [x] Able to follow commands    [] Abnormal -     Eyes:   EOM    [x]  Normal    [] Abnormal -   Sclera  [x]  Normal    [] Abnormal -          Discharge [x]  None visible   [] Abnormal -     HENT: [x] Normocephalic, atraumatic  [] Abnormal -   [x] Mouth/Throat: Mucous membranes are moist    External Ears [x] Normal  [] Abnormal -    Neck: [x] No visualized mass [] Abnormal -     Pulmonary/Chest: [x] Respiratory effort normal   [x] No visualized signs of difficulty breathing or respiratory distress        [] Abnormal -      Musculoskeletal:   [x] Normal gait with no signs of ataxia         [x] Normal range of motion of neck        [] Abnormal -     Neurological:        [x] No Facial Asymmetry (Cranial nerve 7 motor function) (limited exam due to video visit)          [x] No gaze palsy        [] Abnormal -          Skin:        [x] No significant exanthematous lesions or discoloration noted on facial skin         [] Abnormal -            Psychiatric:       [x] Normal Affect [] Abnormal -        [x] No Hallucinations    Other pertinent observable physical exam findings:-        We discussed the expected course, resolution and complications of the diagnosis(es) in detail. Medication risks, benefits, costs, interactions, and alternatives were discussed as indicated. I advised her to contact the office if her condition worsens, changes or fails to improve as anticipated. She expressed understanding with the diagnosis(es) and plan. Ta Garza, was evaluated through a synchronous (real-time) audio-video encounter. The patient (or guardian if applicable) is aware that this is a billable service, which includes applicable co-pays. This Virtual Visit was conducted with patient's (and/or legal guardian's) consent. The visit was conducted pursuant to the emergency declaration under the [address] [place] [address] authority and the [email or web address] and [email or web address] General Act. Patient identification was verified, and a caregiver was present when appropriate.   The patient was located at: Home: [address] [place] [address] [address]  The provider was located at: Home:         Isma Carter NP

## 2022-08-18 ENCOUNTER — OFFICE VISIT (OUTPATIENT)
Dept: FAMILY MEDICINE CLINIC | Age: 37
End: 2022-08-18
Payer: COMMERCIAL

## 2022-08-18 DIAGNOSIS — H57.9 LESION OF EYE: Primary | ICD-10-CM

## 2022-08-18 PROCEDURE — 99213 OFFICE O/P EST LOW 20 MIN: CPT | Performed by: NURSE PRACTITIONER

## 2022-08-18 RX ORDER — NEOMYCIN SULFATE, POLYMYXIN B SULFATE AND DEXAMETHASONE 3.5; 10000; 1 MG/ML; [USP'U]/ML; MG/ML
1 SUSPENSION/ DROPS OPHTHALMIC 2 TIMES DAILY
Qty: 5 ML | Refills: 0 | Status: SHIPPED | OUTPATIENT
Start: 2022-08-18 | End: 2022-08-28

## 2022-08-18 NOTE — PROGRESS NOTES
Assessment/Plan:     Diagnoses and all orders for this visit:    1. Lesion of eye  -     neomycin-polymyxin-dexamethasone (MAXITROL) ophthalmic suspension; Administer 1 Drop to right eye two (2) times a day for 10 days.  -     REFERRAL TO OPHTHALMOLOGY  - Unchanged, sent in neomycin polymyxin drops for patient to use and advised patient for any vision changes, sudden vision changes to seek urgent care immediately. Referral to ophthalmology with our office referral coordinator to assist patient getting in as soon as possible          Discussed expected course/resolution/complications of diagnosis in detail with patient. Medication risks/benefits/costs/interactions/alternatives discussed with patient. Pt was given after visit summary which includes diagnoses, current medications & vitals. Pt expressed understanding with the diagnosis and plan        Subjective:      Ozzie Vann is a 42 y.o. female who presents for had no chief complaint listed for this encounter. Right eye pain started last week, with some light sensitivity, eye was tearing up  Pain has resolved and light sensitivity and tearing up has resolved. 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 5 mL 0    busPIRone (BUSPAR) 15 mg tablet Take 1 Tablet by mouth two (2) times a day. 180 Tablet 1    cyclobenzaprine (FLEXERIL) 5 mg tablet Take 1 Tablet by mouth three (3) times daily as needed for Muscle Spasm(s). 60 Tablet 2    fluticasone propionate (FLONASE) 50 mcg/actuation nasal spray USE 2 SPRAYS IN EACH NOSTRIL DAILY 16 g 2    albuterol (PROVENTIL VENTOLIN) 2.5 mg /3 mL (0.083 %) nebu 3 mL by Nebulization route every four (4) hours as needed for Wheezing.  30 Nebule 2 ondansetron (ZOFRAN ODT) 4 mg disintegrating tablet Take 1 Tab by mouth every eight (8) hours as needed for Vomiting or Nausea or Vomiting. 30 Tab 0    albuterol (Proventil HFA) 90 mcg/actuation inhaler Take 2 Puffs by inhalation every four (4) hours as needed for Wheezing. 1 Inhaler 1    Symbicort 160-4.5 mcg/actuation HFAA TAKE 2 PUFFS BY MOUTH TWICE A DAY (RINSE MOUTH AFTER USE)      valACYclovir (VALTREX) 500 mg tablet daily as needed. cetirizine HCl (ZYRTEC PO) Take  by mouth.          Allergies   Allergen Reactions    Bee Pollen Swelling     Past Medical History:   Diagnosis Date    Asthma     bronchitis 1yr ago    Ill-defined condition     dislocated left jaw due to [place] Palsy    Neurological disorder     [place] Palsy    Proximal phalanx fracture of finger 2010     Past Surgical History:   Procedure Laterality Date    HX ORTHOPAEDIC       Family History   Problem Relation Age of Onset    Hypertension Mother     Asthma Mother     Hypertension Father     Diabetes Father     Diabetes Maternal Grandfather     Hypertension Maternal Grandfather     Heart Disease Maternal Grandfather     COPD Paternal Grandmother     Hypertension Paternal Grandmother     Diabetes Paternal Grandmother     Kidney Disease Paternal Grandmother      Social History     Socioeconomic History    Marital status: SINGLE     Spouse name: Not on file    Number of children: Not on file    Years of education: Not on file    Highest education level: Not on file   Occupational History    Not on file   Tobacco Use    Smoking status: Former     Types: Cigarettes     Quit date: 6/3/2014     Years since quittin.2    Smokeless tobacco: Former   Vaping Use    Vaping Use: Never used   Substance and Sexual Activity    Alcohol use: Yes     Comment: socially    Drug use: No    Sexual activity: Yes     Partners: Male     Birth control/protection: None, Condom   Other Topics Concern    Not on file   Social History Narrative    Not on file Social Determinants of Health     Financial Resource Strain: Not on file   Food Insecurity: Not on file   Transportation Needs: Not on file   Physical Activity: Not on file   Stress: Not on file   Social Connections: Not on file   Intimate Partner Violence: Not on file   Housing Stability: Not on file       HPI      ROS:   Review of Systems   Constitutional:  Negative for chills, fever and malaise/fatigue. Eyes:  Negative for blurred vision, double vision, photophobia, pain, discharge and redness. Respiratory:  Negative for cough and shortness of breath. Cardiovascular:  Negative for chest pain, palpitations and leg swelling. Neurological:  Negative for dizziness and headaches. Objective: There were no vitals taken for this visit. Vitals and Nurse Documentation reviewed. Physical Exam  Constitutional:       General: She is not in acute distress. Appearance: She is not diaphoretic. HENT:      Head: Normocephalic and atraumatic. Eyes:      General: Lids are normal. Lids are everted, no foreign bodies appreciated. Right eye: No foreign body, discharge or hordeolum. Left eye: No foreign body, discharge or hordeolum. Extraocular Movements:      Right eye: Normal extraocular motion. Left eye: Normal extraocular motion. Conjunctiva/sclera:      Right eye: Right conjunctiva is not injected. No exudate. Left eye: No exudate. Comments: Present on right conjunctive are small scattered clear lesions about 10:00   Cardiovascular:      Rate and Rhythm: Normal rate and regular rhythm. Heart sounds: Normal heart sounds. No murmur heard. No friction rub. No gallop. Pulmonary:      Effort: Pulmonary effort is normal. No respiratory distress. Breath sounds: Normal breath sounds. No wheezing or rales. Skin:     General: Skin is warm and dry. Coloration: Skin is not pale. Neurological:      Mental Status: She is alert.        Results for orders placed or performed during the hospital encounter of 09/08/20   HCG URINE, QL. - POC   Result Value Ref Range    Pregnancy test,urine (POC) Negative NEG

## 2022-09-21 ENCOUNTER — OFFICE VISIT (OUTPATIENT)
Dept: FAMILY MEDICINE CLINIC | Age: 37
End: 2022-09-21
Payer: COMMERCIAL

## 2022-09-21 VITALS
HEIGHT: 66 IN | WEIGHT: 215 LBS | SYSTOLIC BLOOD PRESSURE: 117 MMHG | BODY MASS INDEX: 34.55 KG/M2 | OXYGEN SATURATION: 96 % | TEMPERATURE: 98.4 F | RESPIRATION RATE: 16 BRPM | HEART RATE: 86 BPM | DIASTOLIC BLOOD PRESSURE: 78 MMHG

## 2022-09-21 DIAGNOSIS — J01.10 ACUTE NON-RECURRENT FRONTAL SINUSITIS: Primary | ICD-10-CM

## 2022-09-21 DIAGNOSIS — J06.9 VIRAL URI: ICD-10-CM

## 2022-09-21 PROCEDURE — 99213 OFFICE O/P EST LOW 20 MIN: CPT | Performed by: NURSE PRACTITIONER

## 2022-09-21 RX ORDER — AMOXICILLIN AND CLAVULANATE POTASSIUM 875; 125 MG/1; MG/1
1 TABLET, FILM COATED ORAL EVERY 12 HOURS
Qty: 20 TABLET | Refills: 0 | Status: SHIPPED | OUTPATIENT
Start: 2022-09-21 | End: 2022-10-01

## 2022-09-21 NOTE — PATIENT INSTRUCTIONS
Use Flonase, Mucinex, Tylenol, drink fluids.   Take antibiotic with food and probiotic to reduce GI symptoms

## 2022-09-21 NOTE — PROGRESS NOTES
1. Have you been to the ER, urgent care clinic since your last visit? Hospitalized since your last visit? No    2. Have you seen or consulted any other health care providers outside of the [address] [place] [address] since your last visit? Include any pap smears or colon screening. No    Chief Complaint   Patient presents with    Chills    Cough    Cold    Back Pain    Abdominal Pain     Patient reports treatment with Daytime cold / flu relief and nebulizer treatment. Patient reports symptoms started yesterday.      Sinus Pain     Health Maintenance Due   Topic Date Due    Hepatitis C Screening  Never done    DTaP/Tdap/Td series (1 - Tdap) Never done    Cervical cancer screen  04/03/2013    COVID-19 Vaccine (3 - Booster for Moderna series) 07/26/2021    Flu Vaccine (1) 08/01/2022    Depression Screen  09/02/2022     Visit Vitals  /78 (BP 1 Location: Left upper arm, BP Patient Position: Sitting, BP Cuff Size: Adult)   Pulse 86   Temp 98.4 °F (36.9 °C) (Skin)   Resp 16   Ht 5' 6\" (1.676 m)   Wt 215 lb (97.5 kg)   SpO2 96%   BMI 34.70 kg/m²

## 2022-09-21 NOTE — LETTER
NOTIFICATION RETURN TO WORK / SCHOOL    9/21/2022 9:36 AM    Ms. Fannie Melo  [id number]      To Whom It May Concern:    Latanya Tello is currently under the care of [address] [facility]. She will return to work/school on: Please excuse 9/21/22-9/22/22    If there are questions or concerns please have the patient contact our office.         Sincerely,      Say Freeman NP

## 2022-09-21 NOTE — PROGRESS NOTES
Assessment/Plan:     Diagnoses and all orders for this visit:    1. Acute non-recurrent frontal sinusitis  -     amoxicillin-clavulanate (AUGMENTIN) 875-125 mg per tablet; Take 1 Tablet by mouth every twelve (12) hours for 10 days. Indications: acute bacterial infection of the sinuses  Patient reports 3 days of increased abdominal pressure and pain, increased nasal discharge with postnasal drip. Also complaining of ear pain, left tympanic membrane does appear to be infected. 2. Viral URI  Recommend increase fluid, Flonase and Mucinex twice daily Tylenol for fever or pain      Follow-up and Dispositions    Return if symptoms worsen or fail to improve. Discussed expected course/resolution/complications of diagnosis in detail with patient. Medication risks/benefits/costs/interactions/alternatives discussed with patient. Pt was given after visit summary which includes diagnoses, current medications & vitals. Pt expressed understanding with the diagnosis and plan        Subjective:      Sally Toney is a 42 y.o. female who presents for had concerns including Chills, Cough, Cold, Back Pain, Abdominal Pain (Patient reports treatment with Daytime cold / flu relief and nebulizer treatment. Patient reports symptoms started yesterday. ), and Sinus Pain. Current Outpatient Medications   Medication Sig Dispense Refill    amoxicillin-clavulanate (AUGMENTIN) 875-125 mg per tablet Take 1 Tablet by mouth every twelve (12) hours for 10 days. Indications: acute bacterial infection of the sinuses 20 Tablet 0    busPIRone (BUSPAR) 15 mg tablet Take 1 Tablet by mouth two (2) times a day. 180 Tablet 1    cyclobenzaprine (FLEXERIL) 5 mg tablet Take 1 Tablet by mouth three (3) times daily as needed for Muscle Spasm(s).  60 Tablet 2    fluticasone propionate (FLONASE) 50 mcg/actuation nasal spray USE 2 SPRAYS IN EACH NOSTRIL DAILY 16 g 2    albuterol (PROVENTIL VENTOLIN) 2.5 mg /3 mL (0.083 %) nebu 3 mL by Please advise Nebulization route every four (4) hours as needed for Wheezing. 30 Nebule 2    albuterol (Proventil HFA) 90 mcg/actuation inhaler Take 2 Puffs by inhalation every four (4) hours as needed for Wheezing. 1 Inhaler 1    Symbicort 160-4.5 mcg/actuation HFAA TAKE 2 PUFFS BY MOUTH TWICE A DAY (RINSE MOUTH AFTER USE)      valACYclovir (VALTREX) 500 mg tablet daily as needed.  cetirizine HCl (ZYRTEC PO) Take  by mouth.          Allergies   Allergen Reactions    Bee Pollen Swelling     Past Medical History:   Diagnosis Date    Asthma     bronchitis 1yr ago    Ill-defined condition     dislocated left jaw due to [place] Palsy    Neurological disorder     [place] Palsy    Proximal phalanx fracture of finger 2010     Past Surgical History:   Procedure Laterality Date    HX ORTHOPAEDIC       Family History   Problem Relation Age of Onset    Hypertension Mother     Asthma Mother     Hypertension Father     Diabetes Father     Diabetes Maternal Grandfather     Hypertension Maternal Grandfather     Heart Disease Maternal Grandfather     COPD Paternal Grandmother     Hypertension Paternal Grandmother     Diabetes Paternal Grandmother     Kidney Disease Paternal Grandmother      Social History     Socioeconomic History    Marital status: SINGLE     Spouse name: Not on file    Number of children: Not on file    Years of education: Not on file    Highest education level: Not on file   Occupational History    Not on file   Tobacco Use    Smoking status: Former     Types: Cigarettes     Quit date: 6/3/2014     Years since quittin.3    Smokeless tobacco: Former   Vaping Use    Vaping Use: Never used   Substance and Sexual Activity    Alcohol use: Yes     Comment: socially    Drug use: No    Sexual activity: Yes     Partners: Male     Birth control/protection: None, Condom   Other Topics Concern    Not on file   Social History Narrative    Not on file     Social Determinants of Health Financial Resource Strain: Not on file   Food Insecurity: Not on file   Transportation Needs: Not on file   Physical Activity: Not on file   Stress: Not on file   Social Connections: Not on file   Intimate Partner Violence: Not on file   Housing Stability: Not on file       HPI      ROS:   Review of Systems   Constitutional:  Positive for fever. Negative for malaise/fatigue. HENT:  Positive for congestion, ear pain, sinus pain and sore throat. Respiratory:  Positive for cough. Negative for shortness of breath. Cardiovascular:  Negative for chest pain. Gastrointestinal:  Negative for diarrhea, nausea and vomiting. Genitourinary:  Negative for dysuria. Musculoskeletal: Negative. Neurological:  Negative for dizziness and headaches. Endo/Heme/Allergies:  Negative for environmental allergies. Psychiatric/Behavioral: Negative. Objective:   Visit Vitals  /78 (BP 1 Location: Left upper arm, BP Patient Position: Sitting, BP Cuff Size: Adult)   Pulse 86   Temp 98.4 °F (36.9 °C) (Skin)   Resp 16   Ht 5' 6\" (1.676 m)   Wt 215 lb (97.5 kg)   SpO2 96%   BMI 34.70 kg/m²         Vitals and Nurse Documentation reviewed. Physical Exam  Vitals and nursing note reviewed. Constitutional:       General: She is not in acute distress. Appearance: Normal appearance. HENT:      Head: Normocephalic and atraumatic. Ears:      Comments: Left TM fluid filled red. Mouth/Throat:      Mouth: Mucous membranes are moist.   Eyes:      Pupils: Pupils are equal, round, and reactive to light. Cardiovascular:      Rate and Rhythm: Normal rate and regular rhythm. Pulses: Normal pulses. Heart sounds: Normal heart sounds. Pulmonary:      Effort: Pulmonary effort is normal.      Breath sounds: Normal breath sounds. Musculoskeletal:         General: Normal range of motion. Cervical back: Normal range of motion. Skin:     General: Skin is warm and dry.    Neurological:      General: No focal deficit present. Mental Status: She is alert and oriented to person, place, and time. Mental status is at baseline.    Psychiatric:         Mood and Affect: Mood normal.         Behavior: Behavior normal.     Results for orders placed or performed during the hospital encounter of 09/08/20   HCG URINE, QL. - POC   Result Value Ref Range    Pregnancy test,urine (POC) Negative NEG

## 2022-09-26 ENCOUNTER — VIRTUAL VISIT (OUTPATIENT)
Dept: FAMILY MEDICINE CLINIC | Age: 37
End: 2022-09-26
Payer: COMMERCIAL

## 2022-09-26 DIAGNOSIS — U07.1 COVID-19: Primary | ICD-10-CM

## 2022-09-26 PROCEDURE — 99213 OFFICE O/P EST LOW 20 MIN: CPT | Performed by: NURSE PRACTITIONER

## 2022-09-26 NOTE — PROGRESS NOTES
Assessment/Plan:     Diagnoses and all orders for this visit:    1. COVID-19        See VV notes      Discussed expected course/resolution/complications of diagnosis in detail with patient. Medication risks/benefits/costs/interactions/alternatives discussed with patient. Pt was given after visit summary which includes diagnoses, current medications & vitals. Pt expressed understanding with the diagnosis and plan        Subjective:      Marianne Martinez is a 42 y.o. female who presents for had concerns including Positive For Covid-19. Current Outpatient Medications   Medication Sig Dispense Refill    amoxicillin-clavulanate (AUGMENTIN) 875-125 mg per tablet Take 1 Tablet by mouth every twelve (12) hours for 10 days. Indications: acute bacterial infection of the sinuses 20 Tablet 0    busPIRone (BUSPAR) 15 mg tablet Take 1 Tablet by mouth two (2) times a day. 180 Tablet 1    cyclobenzaprine (FLEXERIL) 5 mg tablet Take 1 Tablet by mouth three (3) times daily as needed for Muscle Spasm(s). 60 Tablet 2    fluticasone propionate (FLONASE) 50 mcg/actuation nasal spray USE 2 SPRAYS IN EACH NOSTRIL DAILY 16 g 2    albuterol (PROVENTIL VENTOLIN) 2.5 mg /3 mL (0.083 %) nebu 3 mL by Nebulization route every four (4) hours as needed for Wheezing. 30 Nebule 2    albuterol (Proventil HFA) 90 mcg/actuation inhaler Take 2 Puffs by inhalation every four (4) hours as needed for Wheezing. 1 Inhaler 1    Symbicort 160-4.5 mcg/actuation HFAA TAKE 2 PUFFS BY MOUTH TWICE A DAY (RINSE MOUTH AFTER USE)      valACYclovir (VALTREX) 500 mg tablet daily as needed. cetirizine HCl (ZYRTEC PO) Take  by mouth.          Allergies   Allergen Reactions    Bee Pollen Swelling     Past Medical History:   Diagnosis Date    Asthma     bronchitis 1yr ago    Ill-defined condition     dislocated left jaw due to [place] Palsy    Neurological disorder     [place] Palsy    Proximal phalanx fracture of finger 12/30/2010     Past Surgical History: Procedure Laterality Date    HX ORTHOPAEDIC       Family History   Problem Relation Age of Onset    Hypertension Mother     Asthma Mother     Hypertension Father     Diabetes Father     Diabetes Maternal Grandfather     Hypertension Maternal Grandfather     Heart Disease Maternal Grandfather     COPD Paternal Grandmother     Hypertension Paternal Grandmother     Diabetes Paternal Grandmother     Kidney Disease Paternal Grandmother      Social History     Socioeconomic History    Marital status: SINGLE     Spouse name: Not on file    Number of children: Not on file    Years of education: Not on file    Highest education level: Not on file   Occupational History    Not on file   Tobacco Use    Smoking status: Former     Types: Cigarettes     Quit date: 6/3/2014     Years since quittin.3    Smokeless tobacco: Former   Vaping Use    Vaping Use: Never used   Substance and Sexual Activity    Alcohol use: Yes     Comment: socially    Drug use: No    Sexual activity: Yes     Partners: Male     Birth control/protection: None, Condom   Other Topics Concern    Not on file   Social History Narrative    Not on file     Social Determinants of Health     Financial Resource Strain: Not on file   Food Insecurity: Not on file   Transportation Needs: Not on file   Physical Activity: Not on file   Stress: Not on file   Social Connections: Not on file   Intimate Partner Violence: Not on file   Housing Stability: Not on file       HPI      ROS:   ROS    Objective: There were no vitals taken for this visit. Vitals and Nurse Documentation reviewed.      Physical Exam    Results for orders placed or performed during the hospital encounter of 20   HCG URINE, QL. - POC   Result Value Ref Range    Pregnancy test,urine (POC) Negative NEG

## 2022-09-26 NOTE — PROGRESS NOTES
Diann Haskins (: 1985) is a 42 y.o. female, established patient, here for evaluation of the following chief complaint(s):   Positive For Covid-19       ASSESSMENT/PLAN:  Below is the assessment and plan developed based on review of pertinent history, labs, studies, and medications. 1. COVID-19    Patient tested positive for COVID-19 last week after being seen in office. Continues to recover at home with symptoms of cough and low-grade fever. Patient should remain out of work through the week. Previously has been immunized is taking all prescribed medications and using inhaler as needed. Paperwork for work was completed. Patient should follow-up if symptoms or not improving by the end of the week. No follow-ups on file. SUBJECTIVE/OBJECTIVE:  HPI    Review of Systems   Constitutional:  Positive for fatigue and fever. HENT:  Positive for sinus pressure. Respiratory:  Positive for cough. Cardiovascular: Negative. Gastrointestinal: Negative. Genitourinary: Negative. Musculoskeletal: Negative. Neurological:  Positive for headaches. Hematological: Negative. Psychiatric/Behavioral: Negative. No data recorded     Physical Exam            Diann Haskins, was evaluated through a synchronous (real-time) audio-video encounter. The patient (or guardian if applicable) is aware that this is a billable service, which includes applicable co-pays. This Virtual Visit was conducted with patient's (and/or legal guardian's) consent. The visit was conducted pursuant to the emergency declaration under the [facility], [address] [place] [address] authority and the [employer] General Act. Patient identification was verified, and a caregiver was present when appropriate. The patient was located at: Home: [id number] [place] [address] [address]  The provider was located at:  Facility (Appt Department): [address] [place] [address] [facility] [id number]       An electronic signature was used to authenticate this note.   -- Courtney Bravo, NP

## 2022-10-03 ENCOUNTER — TELEPHONE (OUTPATIENT)
Dept: FAMILY MEDICINE CLINIC | Age: 37
End: 2022-10-03

## 2022-10-03 DIAGNOSIS — U07.1 COVID-19: ICD-10-CM

## 2022-10-03 DIAGNOSIS — B37.31 VAGINAL YEAST INFECTION: Primary | ICD-10-CM

## 2022-10-03 RX ORDER — BENZONATATE 200 MG/1
200 CAPSULE ORAL
Qty: 30 CAPSULE | Refills: 0 | Status: SHIPPED | OUTPATIENT
Start: 2022-10-03 | End: 2022-10-10

## 2022-10-03 RX ORDER — FLUCONAZOLE 150 MG/1
150 TABLET ORAL DAILY
Qty: 1 TABLET | Refills: 0 | Status: SHIPPED | OUTPATIENT
Start: 2022-10-03 | End: 2022-10-04

## 2023-02-01 DIAGNOSIS — F41.9 ANXIETY: ICD-10-CM

## 2023-02-01 DIAGNOSIS — S46.811D STRAIN OF RIGHT TRAPEZIUS MUSCLE, SUBSEQUENT ENCOUNTER: ICD-10-CM

## 2023-02-02 RX ORDER — BUSPIRONE HYDROCHLORIDE 15 MG/1
TABLET ORAL
Qty: 180 TABLET | Refills: 1 | Status: SHIPPED | OUTPATIENT
Start: 2023-02-02

## 2023-02-02 RX ORDER — CYCLOBENZAPRINE HCL 5 MG
TABLET ORAL
Qty: 60 TABLET | Refills: 2 | Status: SHIPPED | OUTPATIENT
Start: 2023-02-02

## 2023-05-23 NOTE — PROGRESS NOTES
Attempted to call. Line busy.            ----- Message from PETRA Hunt sent at 5/23/2023 12:49 PM CDT -----  Chest x ray is normal.   Identified pt with two pt identifiers(name and ). Reviewed record in preparation for visit and have obtained necessary documentation. Chief Complaint   Patient presents with    Neck Pain     Pt states that for x3 day; neck pain occured w/ achy pain, crackling sound    Other     Pt states that sinus appears on/off;         Health Maintenance Due   Topic    Pneumococcal 0-64 years (1 of 1 - PPSV23)    DTaP/Tdap/Td series (1 - Tdap)    PAP AKA CERVICAL CYTOLOGY     Influenza Age 5 to Adult        Coordination of Care Questionnaire:  :   1) Have you been to an emergency room, urgent care, or hospitalized since your last visit? If yes, where when, and reason for visit?  no      2. Have seen or consulted any other health care provider since your last visit?  no  If yes, where when, and reason for visit? Patient is accompanied by self I have received verbal consent from Yoli Shelton to discuss any/all medical information while they are present in the room.

## 2023-06-10 ENCOUNTER — HOSPITAL ENCOUNTER (OUTPATIENT)
Facility: HOSPITAL | Age: 38
End: 2023-06-10
Attending: INTERNAL MEDICINE
Payer: COMMERCIAL

## 2023-06-10 DIAGNOSIS — D86.9 SARCOID: ICD-10-CM

## 2023-06-10 PROCEDURE — 71250 CT THORAX DX C-: CPT

## 2023-06-20 ENCOUNTER — TRANSCRIBE ORDERS (OUTPATIENT)
Facility: HOSPITAL | Age: 38
End: 2023-06-20

## 2023-07-07 ENCOUNTER — OFFICE VISIT (OUTPATIENT)
Age: 38
End: 2023-07-07

## 2023-07-07 VITALS
OXYGEN SATURATION: 96 % | SYSTOLIC BLOOD PRESSURE: 115 MMHG | HEIGHT: 65 IN | DIASTOLIC BLOOD PRESSURE: 80 MMHG | TEMPERATURE: 98.1 F | WEIGHT: 217 LBS | BODY MASS INDEX: 36.15 KG/M2 | HEART RATE: 58 BPM

## 2023-07-07 DIAGNOSIS — S93.401A SPRAIN OF RIGHT ANKLE, UNSPECIFIED LIGAMENT, INITIAL ENCOUNTER: Primary | ICD-10-CM

## 2023-07-07 RX ORDER — NAPROXEN 500 MG/1
500 TABLET ORAL 2 TIMES DAILY WITH MEALS
Qty: 60 TABLET | Refills: 3 | Status: SHIPPED | OUTPATIENT
Start: 2023-07-07

## 2023-07-07 ASSESSMENT — ENCOUNTER SYMPTOMS
RESPIRATORY NEGATIVE: 1
GASTROINTESTINAL NEGATIVE: 1
EYES NEGATIVE: 1

## 2023-07-07 NOTE — PROGRESS NOTES
Subjective: (As above and below)     The patient/guardian gave verbal consent to treat. Chief Complaint   Patient presents with    Ankle Pain     Pt. C/o ankle hurting from a previous injury in 2017. Amelie Arias is a 34 y.o. female who presents for evaluation of :    History provided by:  Patient   used: No    Ankle Pain   The incident occurred more than 1 week ago. The incident occurred at work. There was no injury mechanism. The pain is present in the right ankle. The quality of the pain is described as aching. The pain is at a severity of 5/10. The pain is mild. The pain has been Intermittent since onset. Pertinent negatives include no inability to bear weight, loss of motion, loss of sensation, muscle weakness, numbness or tingling. Associated symptoms comments: Pt is able to bear weight, pain with external rotation . She reports no foreign bodies present. The symptoms are aggravated by movement. She has tried nothing for the symptoms. The treatment provided no relief. Review of Systems   Constitutional: Negative. HENT: Negative. Eyes: Negative. Respiratory: Negative. Cardiovascular: Negative. Gastrointestinal: Negative. Skin: Negative. Neurological:  Negative for tingling and numbness. Review of Systems - negative except as listed above    Reviewed PmHx, RxHx, FmHx, SocHx, AllgHx and updated in chart.   Family History   Problem Relation Age of Onset    Diabetes Maternal Grandfather     Asthma Mother     Hypertension Mother     Kidney Disease Paternal Grandmother     Diabetes Paternal Grandmother     Hypertension Paternal Grandmother     COPD Paternal Grandmother     Hypertension Father     Diabetes Father     Hypertension Maternal Grandfather     Heart Disease Maternal Grandfather        Past Medical History:   Diagnosis Date    Asthma     bronchitis 1yr ago    Ill-defined condition     dislocated left jaw due to [place] Palsy

## 2024-01-08 ENCOUNTER — ROUTINE PRENATAL (OUTPATIENT)
Age: 39
End: 2024-01-08
Payer: COMMERCIAL

## 2024-01-08 VITALS — SYSTOLIC BLOOD PRESSURE: 99 MMHG | DIASTOLIC BLOOD PRESSURE: 63 MMHG | HEART RATE: 86 BPM

## 2024-01-08 DIAGNOSIS — O21.0 HYPEREMESIS GRAVIDARUM: ICD-10-CM

## 2024-01-08 DIAGNOSIS — Z98.891 HISTORY OF CESAREAN SECTION, UNKNOWN SCAR: ICD-10-CM

## 2024-01-08 DIAGNOSIS — E66.09 CLASS 2 OBESITY DUE TO EXCESS CALORIES WITHOUT SERIOUS COMORBIDITY WITH BODY MASS INDEX (BMI) OF 36.0 TO 36.9 IN ADULT: ICD-10-CM

## 2024-01-08 DIAGNOSIS — D86.9 SARCOIDOSIS: ICD-10-CM

## 2024-01-08 DIAGNOSIS — F41.9 ANXIETY: ICD-10-CM

## 2024-01-08 DIAGNOSIS — O36.5990 POOR FETAL GROWTH AFFECTING MANAGEMENT OF MOTHER, ANTEPARTUM, SINGLE OR UNSPECIFIED FETUS: Primary | ICD-10-CM

## 2024-01-08 PROCEDURE — 76811 OB US DETAILED SNGL FETUS: CPT | Performed by: OBSTETRICS & GYNECOLOGY

## 2024-01-08 PROCEDURE — 99204 OFFICE O/P NEW MOD 45 MIN: CPT | Performed by: OBSTETRICS & GYNECOLOGY

## 2024-01-08 PROCEDURE — 76819 FETAL BIOPHYS PROFIL W/O NST: CPT | Performed by: OBSTETRICS & GYNECOLOGY

## 2024-01-08 RX ORDER — FLUTICASONE FUROATE, UMECLIDINIUM BROMIDE AND VILANTEROL TRIFENATATE 100; 62.5; 25 UG/1; UG/1; UG/1
1 POWDER RESPIRATORY (INHALATION) DAILY
COMMUNITY

## 2024-01-10 PROBLEM — D86.9 SARCOIDOSIS: Status: ACTIVE | Noted: 2024-01-10

## 2024-01-10 PROBLEM — E66.812 CLASS 2 OBESITY DUE TO EXCESS CALORIES WITHOUT SERIOUS COMORBIDITY WITH BODY MASS INDEX (BMI) OF 36.0 TO 36.9 IN ADULT: Status: ACTIVE | Noted: 2024-01-10

## 2024-01-10 PROBLEM — O36.5990 POOR FETAL GROWTH, AFFECTING MANAGEMENT OF MOTHER, ANTEPARTUM CONDITION OR COMPLICATION: Status: ACTIVE | Noted: 2024-01-10

## 2024-01-10 PROBLEM — F41.9 ANXIETY: Status: ACTIVE | Noted: 2024-01-10

## 2024-01-10 PROBLEM — Z98.891 HISTORY OF CESAREAN SECTION, UNKNOWN SCAR: Status: ACTIVE | Noted: 2024-01-10

## 2024-01-10 PROBLEM — O21.0 HYPEREMESIS GRAVIDARUM: Status: ACTIVE | Noted: 2024-01-10

## 2024-01-10 PROBLEM — E66.09 CLASS 2 OBESITY DUE TO EXCESS CALORIES WITHOUT SERIOUS COMORBIDITY WITH BODY MASS INDEX (BMI) OF 36.0 TO 36.9 IN ADULT: Status: ACTIVE | Noted: 2024-01-10

## 2024-01-10 NOTE — PROCEDURES
cm    Fetal Anatomy  ===========    Cranium: normal  Lateral ventricles: normal  Choroid plexus: normal  Midline falx: normal  Cavum septi pellucidi: normal  Cerebellum: NOT VISUALIZED  Cisterna magna: NOT VISUALIZED  Head / Neck  Vermis: not visualized  Lips: normal  Profile: normal  Nose: normal  Face  Coronal face: normal  Nasal bone: present  Palate: normal  Maxilla: normal  Mandible: normal  Orbits: normal  4-chamber view: NOT VISUALIZED  RVOT view: NOT VISUALIZED  LVOT view: NOT VISUALIZED  3-vessel view: NOT VISUALIZED  3-vessel-trachea view: NOT VISUALIZED  Heart / Thorax  Situs: situs solitus (normal)  Aortic arch view: NOT VISUALIZED  Bicaval view: normal  Ductal arch view: NOT VISUALIZED  Interventricular septum: SUBOPTIMAL  Cardiac position: levocardia (normal)  Cardiac axis: normal  Cardiac size: normal (approx. 1/3 of thoracic area)  Cardiac rhythm: regular (normal)  Rt lung: normal  Lt lung: normal  Diaphragm: normal  Cord insertion: NOT VISUALIZED  Stomach: normal  Kidneys: normal  Bladder: normal  Genitals: normal  Abdomen  Rt renal artery: normal  Lt renal artery: normal  Cervical spine: normal  Thoracic spine: normal  Lumbar spine: normal  Sacral spine: normal  Rt arm: normal  Lt arm: SUBOPTIMAL  Rt hand: normal  Rt fingers: normal  Lt hand: normal  Lt fingers: normal  Rt leg: normal  Lt leg: normal  Rt foot: normal  Rt toes: normal  Lt foot: normal  Lt toes: SUBOPTIMAL  Wants to know fetal sex: yes    Maternal Structures  ===============    Uterus / Cervix  Cervix: Not visualized  Ovaries / Tubes / Adnexa  Rt ovary: Not visualized  Lt ovary: Not visualized    Biophysical Profile  ==============    2: Fetal breathing movements  2: Gross body movements  2: Fetal tone  2: Amniotic fluid volume  8/8 Biophysical profile score    Findings  =======    Viable Murphy pregnancy at 31w 0d by clinical dates.  EFW is 1609 g at 27% and AC at 49%.  Fetal and maternal anatomy visualized as stated above.

## 2024-02-06 ENCOUNTER — ROUTINE PRENATAL (OUTPATIENT)
Age: 39
End: 2024-02-06
Payer: COMMERCIAL

## 2024-02-06 VITALS — HEART RATE: 90 BPM | SYSTOLIC BLOOD PRESSURE: 102 MMHG | DIASTOLIC BLOOD PRESSURE: 69 MMHG

## 2024-02-06 DIAGNOSIS — E66.09 CLASS 2 OBESITY DUE TO EXCESS CALORIES WITHOUT SERIOUS COMORBIDITY WITH BODY MASS INDEX (BMI) OF 36.0 TO 36.9 IN ADULT: Primary | ICD-10-CM

## 2024-02-06 DIAGNOSIS — F32.89 OTHER DEPRESSION: ICD-10-CM

## 2024-02-06 DIAGNOSIS — F41.9 ANXIETY: ICD-10-CM

## 2024-02-06 DIAGNOSIS — O21.0 HYPEREMESIS GRAVIDARUM: ICD-10-CM

## 2024-02-06 DIAGNOSIS — O09.523 MULTIGRAVIDA OF ADVANCED MATERNAL AGE IN THIRD TRIMESTER: ICD-10-CM

## 2024-02-06 DIAGNOSIS — D86.9 SARCOIDOSIS: ICD-10-CM

## 2024-02-06 DIAGNOSIS — Z98.891 HISTORY OF CESAREAN SECTION, UNKNOWN SCAR: ICD-10-CM

## 2024-02-06 PROBLEM — F32.A DEPRESSION: Status: ACTIVE | Noted: 2024-02-06

## 2024-02-06 PROCEDURE — 76819 FETAL BIOPHYS PROFIL W/O NST: CPT | Performed by: OBSTETRICS & GYNECOLOGY

## 2024-02-06 PROCEDURE — 99213 OFFICE O/P EST LOW 20 MIN: CPT | Performed by: OBSTETRICS & GYNECOLOGY

## 2024-02-06 PROCEDURE — 76816 OB US FOLLOW-UP PER FETUS: CPT | Performed by: OBSTETRICS & GYNECOLOGY

## 2024-02-06 NOTE — PROCEDURES
documenting the findings in the  note.    Follow-up  ========    BPP in 1 week    Coding  ======    Code: 86492  Description: Ultrasound, pregnant uterus, real time with image documentation, follow up, transabdominal approach per fetus

## 2024-02-13 ENCOUNTER — ROUTINE PRENATAL (OUTPATIENT)
Age: 39
End: 2024-02-13
Payer: COMMERCIAL

## 2024-02-13 VITALS — HEART RATE: 85 BPM | SYSTOLIC BLOOD PRESSURE: 100 MMHG | DIASTOLIC BLOOD PRESSURE: 69 MMHG

## 2024-02-13 DIAGNOSIS — O09.523 MULTIGRAVIDA OF ADVANCED MATERNAL AGE IN THIRD TRIMESTER: ICD-10-CM

## 2024-02-13 DIAGNOSIS — O99.213 OBESITY IN PREGNANCY, ANTEPARTUM, THIRD TRIMESTER: ICD-10-CM

## 2024-02-13 DIAGNOSIS — E66.9 OBESITY (BMI 30-39.9): Primary | ICD-10-CM

## 2024-02-13 PROCEDURE — 99212 OFFICE O/P EST SF 10 MIN: CPT | Performed by: OBSTETRICS & GYNECOLOGY

## 2024-02-13 PROCEDURE — 76819 FETAL BIOPHYS PROFIL W/O NST: CPT | Performed by: OBSTETRICS & GYNECOLOGY

## 2024-02-13 NOTE — PROCEDURES
biophysical profile.    Fetal growth on 24 was AGA (29th%ile, abdomen at 50%ile).    Findings discussed and  testing explained.    She relates good fetal movement; fetal movement assessment reinforced.    No new major issues since her last visit.    She is scheduled for a repeat  on 3/4/24.    Following discussion, she related an understanding of the information provided today and had no further questions.  Total physician time including chart review/preparation and completion ~ 10 minutes.    Follow-up  ========    Continue with weekly  testing until delivery.    Coding  ======    Code: 23985  Description: Fetal biophysical profile; without non-stress testing

## 2024-02-14 ENCOUNTER — CARE COORDINATION (OUTPATIENT)
Dept: OTHER | Facility: CLINIC | Age: 39
End: 2024-02-14

## 2024-02-15 ENCOUNTER — CARE COORDINATION (OUTPATIENT)
Dept: OTHER | Facility: CLINIC | Age: 39
End: 2024-02-15

## 2024-02-15 RX ORDER — FAMOTIDINE 20 MG/1
20 TABLET, FILM COATED ORAL 2 TIMES DAILY
COMMUNITY
Start: 2024-02-11

## 2024-02-15 NOTE — CARE COORDINATION
ACM attempted to reach patient for Maternity Care Management/ Care transitions call. HIPAA compliant message left requesting a return phone call at patients convenience. Will continue to follow.     Pt called me back  Patient eligible for [facility] Maternity Management Program    Maternity Care Manager contacted the patient by telephone to discuss the maternity management program.  Patient agrees to care management services at this time. Verified name and  with patient as identifiers.   Patient Current Location: [place]    Risk Factors Identified:  previous c/s, ama, sarcodosis      Needs to be reviewed by the provider   none         Method of communication with provider : none    Advance Care Planning:   Does patient have an Advance Directive:  not on file.     Does patient have OB/Gyn Selected? Yes    Discussed follow up appointments. If no appointment was previously scheduled, appointment scheduling offered: Yes  [facility] follow up appointment(s):   Future Appointments   Date Time Provider Department Center   2024 10:30 AM ULTRASOUND 2 AMB [facility]   3/1/2024 11:00 [facility]   2024 10:30 AM [facility] CT 1 SFMRCT [facility]     [facility]    OB History:   OB History    Para Term  AB Living   2 1 1     1   SAB IAB Ectopic Molar Multiple Live Births             1      # Outcome Date GA Lbr Jorge/2nd Weight Sex Delivery Anes PTL Lv   2 Current            1 Term 18 41w0d  3.538 kg (7 lb 12.8 oz) F CS-LTranv Spinal N GUY       36w3d    Medication reconciliation was performed with patient, who verbalizes understanding of administration of home medications. Advised obtaining a 90-day supply of all daily and as-needed medications.     Barriers/Support system:  Partner- lives with fob  Return to work planning? Yes      2nd and 3rd Trimester Focused Assessment: 36w high risk  Healthy behavior

## 2024-02-16 ENCOUNTER — CARE COORDINATION (OUTPATIENT)
Dept: OTHER | Facility: CLINIC | Age: 39
End: 2024-02-16

## 2024-02-19 ENCOUNTER — CARE COORDINATION (OUTPATIENT)
Dept: OTHER | Facility: CLINIC | Age: 39
End: 2024-02-19

## 2024-02-19 NOTE — CARE COORDINATION
MSW received return call from patient.         Patient concerns  Patient reports concern regarding upcoming rent for March and struggling to afford as patient is out of work due to medical reasons during pregnancy. Patient shared April rent paid through assistance from their mother.    Patient denied STD per report, patient awaiting mailed forms from [employer] to make an appeal. Maternity leave to start 2024, date of planned .    Patient resides in residence with the father of the unborn child and shared 5 year old. Father also has 13 year old in residence.       Medications  Patient shared utilizing HSA and in Be Well with Baby Program No assistance needs noted.     Nutrition  Patient exploring [place], [place]NAP and WIC--recently printed pay stubs to complete application(s).        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   Patient's partner/father of children, works x2 jobs also.      Finances  Patient unable to work at this time due to medical concerns related to pregnancy, partner works x2 jobs and family still struggling to make ends meet.      Plan of action  MSW provided to patient via [employer] information on applying for [employer] plus required budgeting activity sheet--information reviewed with patient during call. Patient reports able to apply on own.    MSW to outreach patient again, patient in agreement with this plan.

## 2024-02-19 NOTE — CARE COORDINATION
MSW contacted patient for evaluation .  Referral sent to  by Nurse JAMIE,  Alpa Momin RN.        Purpose of TC  MSW received referral reporting patient on [facility]ERTO due to medical concerns during pregnancy, denied for STD. Request for MSW to outreach to evaluate for financial hardship and explore for and provide resources as/if applicable.       Plan of action  MSW unable to reach, discreet VM left requesting return call.    MSW to await return call; MSW to attempt outreach again.

## 2024-02-22 ENCOUNTER — ROUTINE PRENATAL (OUTPATIENT)
Age: 39
End: 2024-02-22

## 2024-02-22 ENCOUNTER — CARE COORDINATION (OUTPATIENT)
Dept: OTHER | Facility: CLINIC | Age: 39
End: 2024-02-22

## 2024-02-22 VITALS — SYSTOLIC BLOOD PRESSURE: 90 MMHG | DIASTOLIC BLOOD PRESSURE: 59 MMHG | HEART RATE: 145 BPM

## 2024-02-22 DIAGNOSIS — O09.523 MULTIGRAVIDA OF ADVANCED MATERNAL AGE IN THIRD TRIMESTER: Primary | ICD-10-CM

## 2024-02-22 DIAGNOSIS — O99.213 OBESITY IN PREGNANCY, ANTEPARTUM, THIRD TRIMESTER: ICD-10-CM

## 2024-02-22 DIAGNOSIS — D86.9 SARCOIDOSIS: ICD-10-CM

## 2024-02-22 DIAGNOSIS — Z3A.37 37 WEEKS GESTATION OF PREGNANCY: ICD-10-CM

## 2024-02-22 DIAGNOSIS — O34.219 PREVIOUS CESAREAN DELIVERY, ANTEPARTUM CONDITION OR COMPLICATION: ICD-10-CM

## 2024-02-22 NOTE — PROCEDURES
voice recognition technology and other factors. If there are any questions or concerns about the content of this  note or information contained in this dictation, please let me know as soon as possible.      Throughout this encounter the patient was wearing a mask; FOB was not wearing a mask; I was wearing a mask. I maintained the recommended social distancing.    Due to the increased risk of Covid-19 and other respiratory infections, additional supplies, precautions, screenings and staff time over and above those usually included in  an office visit were utilized and required to support the safe in-person provision of services and mitigate transmission of disease.    Follow-up  ========    RECOMMENDATIONS:  Clinical correlation is needed. As previously recommended and ordered or scheduled.    Coding  ======    Code: 45903  Description: Fetal biophysical profile; without non-stress testing

## 2024-02-26 ENCOUNTER — CARE COORDINATION (OUTPATIENT)
Dept: OTHER | Facility: CLINIC | Age: 39
End: 2024-02-26

## 2024-02-26 NOTE — CARE COORDINATION
MSW Documentation    MSW contacted patient for follow up.  Patient referred by Nurse JAMIE, Alpa Momin RN.      Purpose of TC  MSW outreach to patient to follow up on previous discussion(s) and resources provided.    TC Details  MSW unable to reach, discreet VM left requesting return call.        Plan of action  MSW to await return call; MSW to attempt outreach again.

## 2024-02-28 ENCOUNTER — ROUTINE PRENATAL (OUTPATIENT)
Age: 39
End: 2024-02-28
Payer: COMMERCIAL

## 2024-02-28 VITALS — SYSTOLIC BLOOD PRESSURE: 102 MMHG | DIASTOLIC BLOOD PRESSURE: 70 MMHG | HEART RATE: 102 BPM

## 2024-02-28 DIAGNOSIS — O99.213 OBESITY IN PREGNANCY, ANTEPARTUM, THIRD TRIMESTER: Primary | ICD-10-CM

## 2024-02-28 PROCEDURE — 99212 OFFICE O/P EST SF 10 MIN: CPT | Performed by: OBSTETRICS & GYNECOLOGY

## 2024-02-28 PROCEDURE — 76819 FETAL BIOPHYS PROFIL W/O NST: CPT | Performed by: OBSTETRICS & GYNECOLOGY

## 2024-02-28 PROCEDURE — 76816 OB US FOLLOW-UP PER FETUS: CPT | Performed by: OBSTETRICS & GYNECOLOGY

## 2024-02-28 NOTE — PROCEDURES
PATIENT: ROCIO GARZA   -  : 1985   -  DOS:2024   -  INTERPRETING PROVIDER:John Villatoro,   Indication  ========    AMA, Class II, Sarcoidosis    Method  ======    Transabdominal ultrasound examination. View: Sufficient    Pregnancy  =========    Murphy pregnancy. Number of fetuses: 1    Dating  ======    LMP on: 2023  Cycle: regular cycle  GA by LMP 38 w + 2 d  ALEXY by LMP: 3/11/2024  Previous Ultrasound on: 8/15/2023  Type of prior assessment: GA  GA at prior assessment date 9 w + 4 d  GA by previous U/S 37 w + 5 d  ALEXY by previous Ultrasound: 3/15/2024  Ultrasound examination on: 2024  GA by U/S based upon: AC, BPD, Femur, HC  GA by U/S 36 w + 3 d  ALEXY by U/S: 3/24/2024  Assigned: based on the LMP, selected on 2024  Assigned GA 38 w + 2 d  Assigned ALEXY: 3/11/2024    Fetal Biometry  ============    Standard  BPD 88.7 mm 35w 6d 14% Hadlock  .5 mm -/- 82% Ismael  .5 mm 36w 5d 7% Hadlock  .5 mm 36w 6d 28% Hadlock  Femur 70.6 mm 36w 1d 9% Hadlock  Humerus 63.0 mm 36w 4d 41% Ismael  EFW 2,973 g 36w 5d 23% Hadlock  EFW (lb) 6 lb  EFW (oz) 9 oz  EFW by: Hadlock (BPD-HC-AC-FL)  Other Structures   bpm    General Evaluation  ==============    Cardiac activity present.  bpm. Fetal movements: visualized. Presentation: Cephalic  Placenta: Placental site: Anterior  Umbilical cord: Cord vessels: 3 vessel cord  Amniotic fluid: Amount of AF: normal. MVP 5.5 cm. ALEX 19.6 cm. Q1 5.5 cm, Q2 5.1 cm, Q3 3.8 cm, Q4 5.2 cm    Fetal Anatomy  ===========    4-chamber view: SUBOPTIMAL  LVOT view: SUBOPTIMAL  3-vessel view: normal  3-vessel-trachea view: normal  Heart / Thorax  Situs: situs solitus (normal)  Aortic arch view: NOT VISUALIZED  Ductal arch view: NOT VISUALIZED  Interventricular septum: SUBOPTIMAL  Cardiac rhythm: regular (normal)  Diaphragm: normal  Cord insertion: NOT VISUALIZED  Stomach: normal  Kidneys: normal  Bladder: normal  Lt arm: SUBOPTIMAL  Lt

## 2024-03-01 ENCOUNTER — HOSPITAL ENCOUNTER (OUTPATIENT)
Facility: HOSPITAL | Age: 39
Discharge: HOME | End: 2024-03-01
Attending: STUDENT IN AN ORGANIZED HEALTH CARE EDUCATION/TRAINING PROGRAM | Admitting: STUDENT IN AN ORGANIZED HEALTH CARE EDUCATION/TRAINING PROGRAM
Payer: COMMERCIAL

## 2024-03-01 VITALS
SYSTOLIC BLOOD PRESSURE: 105 MMHG | TEMPERATURE: 97.7 F | DIASTOLIC BLOOD PRESSURE: 66 MMHG | HEART RATE: 106 BPM | RESPIRATION RATE: 18 BRPM | OXYGEN SATURATION: 100 %

## 2024-03-01 LAB
ABO + RH BLD: NORMAL
BLOOD GROUP ANTIBODIES SERPL: NORMAL
ERYTHROCYTE [DISTWIDTH] IN BLOOD BY AUTOMATED COUNT: 13.3 % (ref 11.5–14.5)
HCT VFR BLD AUTO: 33.3 % (ref 35–47)
HGB BLD-MCNC: 11 G/DL (ref 11.5–16)
MCH RBC QN AUTO: 27.6 PG (ref 26–34)
MCHC RBC AUTO-ENTMCNC: 33 G/DL (ref 30–36.5)
MCV RBC AUTO: 83.5 FL (ref 80–99)
NRBC # BLD: 0 K/UL (ref 0–0.01)
NRBC BLD-RTO: 0 PER 100 WBC
PLATELET # BLD AUTO: 239 K/UL (ref 150–400)
PMV BLD AUTO: 10.3 FL (ref 8.9–12.9)
RBC # BLD AUTO: 3.99 M/UL (ref 3.8–5.2)
RPR SER QL: NONREACTIVE
SPECIMEN EXP DATE BLD: NORMAL
WBC # BLD AUTO: 7.9 K/UL (ref 3.6–11)

## 2024-03-01 PROCEDURE — 85027 COMPLETE CBC AUTOMATED: CPT

## 2024-03-01 PROCEDURE — 86850 RBC ANTIBODY SCREEN: CPT

## 2024-03-01 PROCEDURE — 86900 BLOOD TYPING SEROLOGIC ABO: CPT

## 2024-03-01 PROCEDURE — 86901 BLOOD TYPING SEROLOGIC RH(D): CPT

## 2024-03-01 PROCEDURE — 86592 SYPHILIS TEST NON-TREP QUAL: CPT

## 2024-03-01 PROCEDURE — 36415 COLL VENOUS BLD VENIPUNCTURE: CPT

## 2024-03-01 RX ORDER — BUDESONIDE, GLYCOPYRROLATE, AND FORMOTEROL FUMARATE 160; 9; 4.8 UG/1; UG/1; UG/1
AEROSOL, METERED RESPIRATORY (INHALATION)
COMMUNITY

## 2024-03-01 NOTE — PROGRESS NOTES
@1110 Patient arrived for PAT for scheduled repeat c/s on 3/4 at 2pm    @1210 Patient discharged home with c/s information packet and supplies. All questions answered.

## 2024-03-02 ENCOUNTER — ANESTHESIA EVENT (OUTPATIENT)
Facility: HOSPITAL | Age: 39
End: 2024-03-02
Payer: COMMERCIAL

## 2024-03-04 ENCOUNTER — ANESTHESIA (OUTPATIENT)
Facility: HOSPITAL | Age: 39
End: 2024-03-04
Payer: COMMERCIAL

## 2024-03-04 ENCOUNTER — HOSPITAL ENCOUNTER (INPATIENT)
Facility: HOSPITAL | Age: 39
LOS: 3 days | Discharge: HOME | End: 2024-03-07
Attending: STUDENT IN AN ORGANIZED HEALTH CARE EDUCATION/TRAINING PROGRAM | Admitting: STUDENT IN AN ORGANIZED HEALTH CARE EDUCATION/TRAINING PROGRAM
Payer: COMMERCIAL

## 2024-03-04 DIAGNOSIS — Z98.891 HISTORY OF CESAREAN SECTION: Primary | ICD-10-CM

## 2024-03-04 LAB
ABO + RH BLD: NORMAL
BLOOD GROUP ANTIBODIES SERPL: NORMAL
ERYTHROCYTE [DISTWIDTH] IN BLOOD BY AUTOMATED COUNT: 17.2 % (ref 11.5–14.5)
HCT VFR BLD AUTO: 35.5 % (ref 35–47)
HGB BLD-MCNC: 11.2 G/DL (ref 11.5–16)
MCH RBC QN AUTO: 27.7 PG (ref 26–34)
MCHC RBC AUTO-ENTMCNC: 31.5 G/DL (ref 30–36.5)
MCV RBC AUTO: 87.9 FL (ref 80–99)
NRBC # BLD: 0 K/UL (ref 0–0.01)
NRBC BLD-RTO: 0 PER 100 WBC
PLATELET # BLD AUTO: 221 K/UL (ref 150–400)
PMV BLD AUTO: 10.6 FL (ref 8.9–12.9)
RBC # BLD AUTO: 4.04 M/UL (ref 3.8–5.2)
SPECIMEN EXP DATE BLD: NORMAL
WBC # BLD AUTO: 6.5 K/UL (ref 3.6–11)

## 2024-03-04 PROCEDURE — 6370000000 HC RX 637 (ALT 250 FOR IP): Performed by: STUDENT IN AN ORGANIZED HEALTH CARE EDUCATION/TRAINING PROGRAM

## 2024-03-04 PROCEDURE — 6360000002 HC RX W HCPCS: Performed by: STUDENT IN AN ORGANIZED HEALTH CARE EDUCATION/TRAINING PROGRAM

## 2024-03-04 PROCEDURE — 88302 TISSUE EXAM BY PATHOLOGIST: CPT

## 2024-03-04 PROCEDURE — 3609079900 HC CESAREAN SECTION: Performed by: STUDENT IN AN ORGANIZED HEALTH CARE EDUCATION/TRAINING PROGRAM

## 2024-03-04 PROCEDURE — 4A1HXCZ MONITORING OF PRODUCTS OF CONCEPTION, CARDIAC RATE, EXTERNAL APPROACH: ICD-10-PCS | Performed by: STUDENT IN AN ORGANIZED HEALTH CARE EDUCATION/TRAINING PROGRAM

## 2024-03-04 PROCEDURE — 3700000001 HC ADD 15 MINUTES (ANESTHESIA): Performed by: STUDENT IN AN ORGANIZED HEALTH CARE EDUCATION/TRAINING PROGRAM

## 2024-03-04 PROCEDURE — 85027 COMPLETE CBC AUTOMATED: CPT

## 2024-03-04 PROCEDURE — 2500000003 HC RX 250 WO HCPCS: Performed by: STUDENT IN AN ORGANIZED HEALTH CARE EDUCATION/TRAINING PROGRAM

## 2024-03-04 PROCEDURE — 0UB70ZZ EXCISION OF BILATERAL FALLOPIAN TUBES, OPEN APPROACH: ICD-10-PCS | Performed by: STUDENT IN AN ORGANIZED HEALTH CARE EDUCATION/TRAINING PROGRAM

## 2024-03-04 PROCEDURE — 6360000002 HC RX W HCPCS: Performed by: NURSE ANESTHETIST, CERTIFIED REGISTERED

## 2024-03-04 PROCEDURE — 7100000001 HC PACU RECOVERY - ADDTL 15 MIN: Performed by: STUDENT IN AN ORGANIZED HEALTH CARE EDUCATION/TRAINING PROGRAM

## 2024-03-04 PROCEDURE — 7100000000 HC PACU RECOVERY - FIRST 15 MIN: Performed by: STUDENT IN AN ORGANIZED HEALTH CARE EDUCATION/TRAINING PROGRAM

## 2024-03-04 PROCEDURE — 2720000010 HC SURG SUPPLY STERILE: Performed by: STUDENT IN AN ORGANIZED HEALTH CARE EDUCATION/TRAINING PROGRAM

## 2024-03-04 PROCEDURE — 36415 COLL VENOUS BLD VENIPUNCTURE: CPT

## 2024-03-04 PROCEDURE — 2709999900 HC NON-CHARGEABLE SUPPLY: Performed by: STUDENT IN AN ORGANIZED HEALTH CARE EDUCATION/TRAINING PROGRAM

## 2024-03-04 PROCEDURE — 3700000000 HC ANESTHESIA ATTENDED CARE: Performed by: STUDENT IN AN ORGANIZED HEALTH CARE EDUCATION/TRAINING PROGRAM

## 2024-03-04 PROCEDURE — 88305 TISSUE EXAM BY PATHOLOGIST: CPT

## 2024-03-04 PROCEDURE — 2580000003 HC RX 258: Performed by: NURSE ANESTHETIST, CERTIFIED REGISTERED

## 2024-03-04 PROCEDURE — 2580000003 HC RX 258: Performed by: STUDENT IN AN ORGANIZED HEALTH CARE EDUCATION/TRAINING PROGRAM

## 2024-03-04 PROCEDURE — 1120000000 HC RM PRIVATE OB

## 2024-03-04 PROCEDURE — C1765 ADHESION BARRIER: HCPCS | Performed by: STUDENT IN AN ORGANIZED HEALTH CARE EDUCATION/TRAINING PROGRAM

## 2024-03-04 RX ORDER — OXYCODONE HYDROCHLORIDE 5 MG/1
10 TABLET ORAL EVERY 4 HOURS PRN
Status: DISCONTINUED | OUTPATIENT
Start: 2024-03-04 | End: 2024-03-07 | Stop reason: HOSPADM

## 2024-03-04 RX ORDER — SODIUM CHLORIDE 0.9 % (FLUSH) 0.9 %
5-40 SYRINGE (ML) INJECTION PRN
Status: DISCONTINUED | OUTPATIENT
Start: 2024-03-04 | End: 2024-03-07 | Stop reason: HOSPADM

## 2024-03-04 RX ORDER — FERROUS SULFATE 325(65) MG
325 TABLET ORAL EVERY OTHER DAY
Status: DISCONTINUED | OUTPATIENT
Start: 2024-03-04 | End: 2024-03-07 | Stop reason: HOSPADM

## 2024-03-04 RX ORDER — SODIUM CHLORIDE 9 MG/ML
INJECTION, SOLUTION INTRAVENOUS PRN
Status: DISCONTINUED | OUTPATIENT
Start: 2024-03-04 | End: 2024-03-07 | Stop reason: HOSPADM

## 2024-03-04 RX ORDER — ONDANSETRON 2 MG/ML
4 INJECTION INTRAMUSCULAR; INTRAVENOUS EVERY 6 HOURS PRN
Status: DISCONTINUED | OUTPATIENT
Start: 2024-03-04 | End: 2024-03-07 | Stop reason: HOSPADM

## 2024-03-04 RX ORDER — IBUPROFEN 400 MG/1
800 TABLET ORAL EVERY 8 HOURS SCHEDULED
Status: DISCONTINUED | OUTPATIENT
Start: 2024-03-04 | End: 2024-03-07 | Stop reason: HOSPADM

## 2024-03-04 RX ORDER — SODIUM CHLORIDE 0.9 % (FLUSH) 0.9 %
10 SYRINGE (ML) INJECTION PRN
Status: DISCONTINUED | OUTPATIENT
Start: 2024-03-04 | End: 2024-03-04

## 2024-03-04 RX ORDER — DEXAMETHASONE SODIUM PHOSPHATE 4 MG/ML
INJECTION, SOLUTION INTRA-ARTICULAR; INTRALESIONAL; INTRAMUSCULAR; INTRAVENOUS; SOFT TISSUE PRN
Status: DISCONTINUED | OUTPATIENT
Start: 2024-03-04 | End: 2024-03-04 | Stop reason: SDUPTHER

## 2024-03-04 RX ORDER — ACETAMINOPHEN 500 MG
1000 TABLET ORAL EVERY 8 HOURS SCHEDULED
Status: DISCONTINUED | OUTPATIENT
Start: 2024-03-04 | End: 2024-03-07 | Stop reason: HOSPADM

## 2024-03-04 RX ORDER — MORPHINE SULFATE 1 MG/ML
INJECTION, SOLUTION EPIDURAL; INTRATHECAL; INTRAVENOUS
Status: COMPLETED | OUTPATIENT
Start: 2024-03-04 | End: 2024-03-04

## 2024-03-04 RX ORDER — OXYCODONE HYDROCHLORIDE 5 MG/1
5 TABLET ORAL EVERY 4 HOURS PRN
Status: DISCONTINUED | OUTPATIENT
Start: 2024-03-04 | End: 2024-03-07 | Stop reason: HOSPADM

## 2024-03-04 RX ORDER — DIPHENHYDRAMINE HCL 25 MG
25 CAPSULE ORAL EVERY 6 HOURS PRN
Status: DISCONTINUED | OUTPATIENT
Start: 2024-03-04 | End: 2024-03-07 | Stop reason: HOSPADM

## 2024-03-04 RX ORDER — BUPIVACAINE HYDROCHLORIDE 7.5 MG/ML
INJECTION, SOLUTION INTRASPINAL
Status: COMPLETED | OUTPATIENT
Start: 2024-03-04 | End: 2024-03-04

## 2024-03-04 RX ORDER — ONDANSETRON 4 MG/1
4 TABLET, ORALLY DISINTEGRATING ORAL EVERY 8 HOURS PRN
Status: DISCONTINUED | OUTPATIENT
Start: 2024-03-04 | End: 2024-03-07 | Stop reason: HOSPADM

## 2024-03-04 RX ORDER — DOCUSATE SODIUM 100 MG/1
100 CAPSULE, LIQUID FILLED ORAL 2 TIMES DAILY
Status: DISCONTINUED | OUTPATIENT
Start: 2024-03-04 | End: 2024-03-07 | Stop reason: HOSPADM

## 2024-03-04 RX ORDER — SODIUM CHLORIDE 0.9 % (FLUSH) 0.9 %
5-40 SYRINGE (ML) INJECTION EVERY 12 HOURS SCHEDULED
Status: DISCONTINUED | OUTPATIENT
Start: 2024-03-04 | End: 2024-03-07 | Stop reason: HOSPADM

## 2024-03-04 RX ORDER — FENTANYL CITRATE 50 UG/ML
INJECTION, SOLUTION INTRAMUSCULAR; INTRAVENOUS
Status: COMPLETED | OUTPATIENT
Start: 2024-03-04 | End: 2024-03-04

## 2024-03-04 RX ORDER — SODIUM CHLORIDE 0.9 % (FLUSH) 0.9 %
5-40 SYRINGE (ML) INJECTION EVERY 12 HOURS SCHEDULED
Status: DISCONTINUED | OUTPATIENT
Start: 2024-03-04 | End: 2024-03-04

## 2024-03-04 RX ORDER — OXYCODONE HYDROCHLORIDE 5 MG/1
5 TABLET ORAL EVERY 4 HOURS PRN
Status: DISCONTINUED | OUTPATIENT
Start: 2024-03-04 | End: 2024-03-04

## 2024-03-04 RX ORDER — HYDROMORPHONE HYDROCHLORIDE 1 MG/ML
0.25 INJECTION, SOLUTION INTRAMUSCULAR; INTRAVENOUS; SUBCUTANEOUS EVERY 6 HOURS PRN
Status: DISCONTINUED | OUTPATIENT
Start: 2024-03-04 | End: 2024-03-04

## 2024-03-04 RX ORDER — MODIFIED LANOLIN
OINTMENT (GRAM) TOPICAL
Status: DISCONTINUED | OUTPATIENT
Start: 2024-03-04 | End: 2024-03-07 | Stop reason: HOSPADM

## 2024-03-04 RX ORDER — DIPHENHYDRAMINE HYDROCHLORIDE 50 MG/ML
12.5 INJECTION INTRAMUSCULAR; INTRAVENOUS EVERY 4 HOURS PRN
Status: DISCONTINUED | OUTPATIENT
Start: 2024-03-04 | End: 2024-03-04

## 2024-03-04 RX ORDER — ACETAMINOPHEN 325 MG/1
975 TABLET ORAL ONCE
Status: COMPLETED | OUTPATIENT
Start: 2024-03-04 | End: 2024-03-04

## 2024-03-04 RX ORDER — KETOROLAC TROMETHAMINE 30 MG/ML
INJECTION, SOLUTION INTRAMUSCULAR; INTRAVENOUS PRN
Status: DISCONTINUED | OUTPATIENT
Start: 2024-03-04 | End: 2024-03-04 | Stop reason: SDUPTHER

## 2024-03-04 RX ORDER — SODIUM CHLORIDE, SODIUM LACTATE, POTASSIUM CHLORIDE, AND CALCIUM CHLORIDE .6; .31; .03; .02 G/100ML; G/100ML; G/100ML; G/100ML
1000 INJECTION, SOLUTION INTRAVENOUS ONCE
Status: DISCONTINUED | OUTPATIENT
Start: 2024-03-04 | End: 2024-03-04

## 2024-03-04 RX ORDER — NALOXONE HYDROCHLORIDE 0.4 MG/ML
INJECTION, SOLUTION INTRAMUSCULAR; INTRAVENOUS; SUBCUTANEOUS PRN
Status: DISCONTINUED | OUTPATIENT
Start: 2024-03-04 | End: 2024-03-04

## 2024-03-04 RX ORDER — KETOROLAC TROMETHAMINE 30 MG/ML
30 INJECTION, SOLUTION INTRAMUSCULAR; INTRAVENOUS EVERY 6 HOURS
Status: DISCONTINUED | OUTPATIENT
Start: 2024-03-04 | End: 2024-03-04

## 2024-03-04 RX ORDER — ONDANSETRON 2 MG/ML
4 INJECTION INTRAMUSCULAR; INTRAVENOUS EVERY 6 HOURS PRN
Status: DISCONTINUED | OUTPATIENT
Start: 2024-03-04 | End: 2024-03-04

## 2024-03-04 RX ORDER — ONDANSETRON 2 MG/ML
INJECTION INTRAMUSCULAR; INTRAVENOUS PRN
Status: DISCONTINUED | OUTPATIENT
Start: 2024-03-04 | End: 2024-03-04 | Stop reason: SDUPTHER

## 2024-03-04 RX ORDER — MISOPROSTOL 200 UG/1
800 TABLET ORAL PRN
Status: DISCONTINUED | OUTPATIENT
Start: 2024-03-04 | End: 2024-03-07 | Stop reason: HOSPADM

## 2024-03-04 RX ORDER — SODIUM CHLORIDE 9 MG/ML
INJECTION, SOLUTION INTRAVENOUS PRN
Status: DISCONTINUED | OUTPATIENT
Start: 2024-03-04 | End: 2024-03-04

## 2024-03-04 RX ORDER — KETOROLAC TROMETHAMINE 30 MG/ML
30 INJECTION, SOLUTION INTRAMUSCULAR; INTRAVENOUS EVERY 6 HOURS
Status: DISPENSED | OUTPATIENT
Start: 2024-03-04 | End: 2024-03-06

## 2024-03-04 RX ORDER — ACETAMINOPHEN 325 MG/1
650 TABLET ORAL EVERY 6 HOURS
Status: DISCONTINUED | OUTPATIENT
Start: 2024-03-04 | End: 2024-03-04

## 2024-03-04 RX ORDER — ENOXAPARIN SODIUM 100 MG/ML
40 INJECTION SUBCUTANEOUS DAILY
Status: DISCONTINUED | OUTPATIENT
Start: 2024-03-05 | End: 2024-03-07 | Stop reason: HOSPADM

## 2024-03-04 RX ORDER — SWAB
1 SWAB, NON-MEDICATED MISCELLANEOUS DAILY
Status: DISCONTINUED | OUTPATIENT
Start: 2024-03-04 | End: 2024-03-07 | Stop reason: HOSPADM

## 2024-03-04 RX ORDER — SODIUM CHLORIDE, SODIUM LACTATE, POTASSIUM CHLORIDE, CALCIUM CHLORIDE 600; 310; 30; 20 MG/100ML; MG/100ML; MG/100ML; MG/100ML
INJECTION, SOLUTION INTRAVENOUS CONTINUOUS PRN
Status: DISCONTINUED | OUTPATIENT
Start: 2024-03-04 | End: 2024-03-04 | Stop reason: SDUPTHER

## 2024-03-04 RX ORDER — SODIUM CHLORIDE, SODIUM LACTATE, POTASSIUM CHLORIDE, CALCIUM CHLORIDE 600; 310; 30; 20 MG/100ML; MG/100ML; MG/100ML; MG/100ML
INJECTION, SOLUTION INTRAVENOUS CONTINUOUS
Status: DISCONTINUED | OUTPATIENT
Start: 2024-03-04 | End: 2024-03-04

## 2024-03-04 RX ADMIN — SODIUM CHLORIDE, POTASSIUM CHLORIDE, SODIUM LACTATE AND CALCIUM CHLORIDE: 600; 310; 30; 20 INJECTION, SOLUTION INTRAVENOUS at 14:43

## 2024-03-04 RX ADMIN — FENTANYL CITRATE 15 MCG: 50 INJECTION, SOLUTION INTRAMUSCULAR; INTRAVENOUS at 14:50

## 2024-03-04 RX ADMIN — MORPHINE SULFATE 0.15 MG: 1 INJECTION EPIDURAL; INTRATHECAL; INTRAVENOUS at 14:50

## 2024-03-04 RX ADMIN — KETOROLAC TROMETHAMINE 30 MG: 30 INJECTION, SOLUTION INTRAMUSCULAR; INTRAVENOUS at 15:36

## 2024-03-04 RX ADMIN — PHENYLEPHRINE HYDROCHLORIDE 40 MCG/MIN: 10 INJECTION INTRAVENOUS at 14:49

## 2024-03-04 RX ADMIN — ONDANSETRON 4 MG: 2 INJECTION INTRAMUSCULAR; INTRAVENOUS at 18:34

## 2024-03-04 RX ADMIN — KETOROLAC TROMETHAMINE 30 MG: 30 INJECTION, SOLUTION INTRAMUSCULAR; INTRAVENOUS at 21:09

## 2024-03-04 RX ADMIN — ACETAMINOPHEN 975 MG: 325 TABLET ORAL at 13:53

## 2024-03-04 RX ADMIN — BUPIVACAINE HYDROCHLORIDE IN DEXTROSE 12 MG: 7.5 INJECTION, SOLUTION SUBARACHNOID at 14:50

## 2024-03-04 RX ADMIN — SODIUM CHLORIDE, PRESERVATIVE FREE 20 MG: 5 INJECTION INTRAVENOUS at 13:54

## 2024-03-04 RX ADMIN — OXYCODONE 5 MG: 5 TABLET ORAL at 18:35

## 2024-03-04 RX ADMIN — Medication 909 ML/HR: at 15:16

## 2024-03-04 RX ADMIN — WATER 2000 MG: 1 INJECTION INTRAMUSCULAR; INTRAVENOUS; SUBCUTANEOUS at 14:04

## 2024-03-04 RX ADMIN — DEXAMETHASONE SODIUM PHOSPHATE 4 MG: 4 INJECTION INTRA-ARTICULAR; INTRALESIONAL; INTRAMUSCULAR; INTRAVENOUS; SOFT TISSUE at 15:36

## 2024-03-04 RX ADMIN — ONDANSETRON 4 MG: 2 INJECTION INTRAMUSCULAR; INTRAVENOUS at 14:49

## 2024-03-04 ASSESSMENT — PAIN DESCRIPTION - ORIENTATION
ORIENTATION: MID
ORIENTATION: LOWER

## 2024-03-04 ASSESSMENT — PAIN DESCRIPTION - DESCRIPTORS
DESCRIPTORS: SORE
DESCRIPTORS: SORE

## 2024-03-04 ASSESSMENT — PAIN - FUNCTIONAL ASSESSMENT
PAIN_FUNCTIONAL_ASSESSMENT: ACTIVITIES ARE NOT PREVENTED
PAIN_FUNCTIONAL_ASSESSMENT: ACTIVITIES ARE NOT PREVENTED

## 2024-03-04 ASSESSMENT — PAIN DESCRIPTION - LOCATION
LOCATION: ABDOMEN
LOCATION: INCISION

## 2024-03-04 ASSESSMENT — PAIN SCALES - GENERAL
PAINLEVEL_OUTOF10: 9
PAINLEVEL_OUTOF10: 4

## 2024-03-04 NOTE — ANESTHESIA POSTPROCEDURE EVALUATION
Department of Anesthesiology  Postprocedure Note    Patient: Ginny Joyner  MRN: [id number]  YOB: 1985  Date of evaluation: 3/4/2024    Procedure Summary       Date: 24 Room / Location: [facility] L&D 01 / [facility] L&D OR    Anesthesia Start: 1444 Anesthesia Stop: 1557    Procedure:  SECTION (E R A S) (Abdomen) Diagnosis:       Delivery by classical  section      (Delivery by classical  section [O82])    Surgeons: Sarah Morris MD Responsible Provider: Yu Galvez DO    Anesthesia Type: MAC, Spinal ASA Status: 2            Anesthesia Type: MAC, Spinal    Miquel Phase I:      Miquel Phase II:      Anesthesia Post Evaluation    Patient location during evaluation: PACU  Patient participation: complete - patient participated  Level of consciousness: awake and alert  Pain score: 0  Airway patency: patent  Nausea & Vomiting: no nausea and no vomiting  Cardiovascular status: blood pressure returned to baseline and hemodynamically stable  Respiratory status: acceptable and room air  Hydration status: euvolemic  Multimodal analgesia pain management approach  Pain management: adequate and satisfactory to patient    There were no known notable events for this encounter.

## 2024-03-04 NOTE — ANESTHESIA PROCEDURE NOTES
Spinal Block    Patient location during procedure: OR  End time: 3/4/2024 2:56 PM  Reason for block: primary anesthetic  Staffing  Performed: anesthesiologist   Anesthesiologist: Yu Galvez DO  Performed by: Yu Galvez DO  Authorized by: Yu Galvez DO    Spinal Block  Patient position: sitting  Prep: DuraPrep  Patient monitoring: cardiac monitor, continuous pulse ox, frequent blood pressure checks and oxygen  Approach: midline  Location: L4/L5  Provider prep: mask and sterile gloves  Needle  Needle type: pencil-tip   Needle gauge: 25 G  Needle length: 4 in  Assessment  Sensory level: T4  Events: None  Swirl obtained: Yes  CSF: clear  Attempts: 2  Hemodynamics: stable  Preanesthetic Checklist  Completed: patient identified, IV checked, site marked, risks and benefits discussed, surgical/procedural consents, equipment checked, pre-op evaluation, timeout performed, anesthesia consent given, oxygen available, monitors applied/VS acknowledged and fire risk safety assessment completed and verbalized

## 2024-03-04 NOTE — CONSULTS
Clinical Ethics Consultation Note    ERD Advisement    Consulted by Dr. Morris for this 38 year old female who has requested a salpingectomy to reduce the risk of cancer.? The patient will have repeat  today. The patient has increased risk for breast, ovarian, and/or uterine cancer due to family history of ovarian cancer. The patient has been counseled regarding irreversible infertility. This procedure is consistent with the ERDs, as there is a strong likelihood of existing pathology in her fallopian tubes, and the procedure is morally justified.? For questions or concerns regarding this consultation, please contact me directly via [employer].    Nancy العلي  Ethics Consultant?

## 2024-03-04 NOTE — OP NOTE
procedure. Sponge, lap, and needle counts were correct times two. The patient tolerated the procedure well and was transported to the recovery room in stable condition. The patient did receive 2 g of Ancef preoperatively.      Geri Morris MD  3/4/2024  3:49 PM

## 2024-03-04 NOTE — ANESTHESIA PRE PROCEDURE
Department of Anesthesiology  Preprocedure Note       Name:  Ginny Joyner   Age:  38 y.o.  :  1985                                          MRN:  [id number]         Date:  3/4/2024      Surgeon: Surgeon(s):  Sarah Morris MD    Procedure: Procedure(s):   SECTION (E R A S)    Medications prior to admission:   Prior to Admission medications    Medication Sig Start Date End Date Taking? Authorizing Provider   Budeson-Glycopyrrol-Formoterol (BREZTRI AEROSPHERE) 160-9-4.8 MCG/ACT AERO Inhale into the lungs    Provider, MD Coy   famotidine (PEPCID) 20 MG tablet 1 tablet 2 times daily 24   Coy Beard MD   fluticasone-umeclidin-vilant (TRELEGY ELLIPTA) 100-62.5-25 MCG/ACT AEPB inhaler Inhale 1 puff into the lungs daily  Patient not taking: Reported on 3/1/2024    ProviderCoy MD   Prenatal Vit w/Qe-Dmtxmcmbh-IY (PNV PO) Take by mouth    ProviderCoy MD   naproxen (NAPROSYN) 500 MG tablet Take 1 tablet by mouth 2 times daily (with meals)  Patient not taking: Reported on 2024   Ericka Bowles APRN - ROMI   albuterol sulfate HFA (PROVENTIL;VENTOLIN;PROAIR) 108 (90 Base) MCG/ACT inhaler Inhale 2 puffs into the lungs every 4 hours as needed 3/10/21   Automatic Reconciliation, Ar   albuterol (PROVENTIL) (2.5 MG/3ML) 0.083% nebulizer solution Inhale 3 mLs into the lungs every 4 hours as needed 5/3/21   Automatic Reconciliation, Ar   budesonide-formoterol (SYMBICORT) 160-4.5 MCG/ACT AERO  20   Automatic Reconciliation, Ar   busPIRone (BUSPAR) 15 MG tablet TAKE 1 TABLET BY MOUTH 2 TIMES A DAY  Patient not taking: Reported on 3/1/2024 2/2/23   Automatic Reconciliation, Ar   cyclobenzaprine (FLEXERIL) 5 MG tablet TAKE 1 TABLET BY MOUTH 3 TIMES A DAY AS NEEDED FOR MUSCLE SPASMS  Patient not taking: Reported on 2024   Automatic Reconciliation, Ar   fluticasone (FLONASE) 50 MCG/ACT nasal spray USE 2 SPRAYS IN EACH NOSTRIL DAILY 22

## 2024-03-04 NOTE — L&D DELIVERY NOTE
Mingo Joyner [[id number]]      Labor Events     Labor: No   Steroids: None  Cervical Ripening Date/Time:      Antibiotics Received during Labor: No  Rupture Date/Time:  3/4/24 15:13:00   Rupture Type: AROM  Fluid Color: Clear  Fluid Odor: None  Fluid Volume: Moderate  Induction: None  Augmentation: None  Labor Complications: None              Anesthesia    Method: Spinal       Labor Length    3rd stage: 0h 02m       Delivery Details      Delivery Date: 3/4/24 Delivery Time: 15:14:00   Delivery Type: , Low Transverse  Trial of Labor?: No   Categorization: Repeat   Priority: scheduled  Indications for : Prior Uterine Surgery       Skin Incision Type: Pfannenstiel  Uterine Incision: Low Transverse       [place] Presentation    Presentation: Vertex       Shoulder Dystocia    Shoulder Dystocia Present?: No       Assisted Delivery Details    Forceps Attempted?: No  Vacuum Extractor Attempted?: No                           Cord    Vessels: 3 Vessels  Complications: None  Delayed Cord Clamping?: Yes  Cord Clamped Date/Time: 3/4/2024 15:15:42  Cord Blood Disposition: Lab  Gases Sent?: No              Placenta    Date/Time: 3/4/2024 15:16:48  Removal: Manual Removal  Appearance: Intact  Disposition: Discarded       Lacerations    Episiotomy: None  Perineal Lacerations: None  Other Lacerations: no non-perineal laceration       Blood Loss  Mother: Ginny Joyner #[id number]     Start of Mother's Information      Delivery Blood Loss  24 1444 - 24 1833      Quantitative Blood Loss (mL) Hospital Encounter 260 grams    Total  260 mL               End of Mother's Information  Mother: Ginny Joyner #[id number]                Delivery Providers    Delivering clinician: Sarah Morris MD     Provider Role    Ban Carreon RN Primary Nurse    Mikaela Villareal RN Primary [place] Nurse              [place] Assessment    Living Status: Living  Delivery

## 2024-03-04 NOTE — PROGRESS NOTES
1155 Pt arrived to LDR 11 for repeat c/s.    1444 Pt in OR 1    1514 Healthy baby boy delivered via repeat c/s by Dr. Morris.     1520 AGARS 9/9. Baby healthy and vigorous    1740 Pt cleaned up and minimal amount of bleeding noted on pad. Pad changed.    1745 This RN transferred pt with infant in arms.    1750 TRANSFER - OUT REPORT:    Verbal report given to KAMI Sher RN on Ginny Joyner  being transferred to MIU for routine progression of patient care       Report consisted of patient's Situation, Background, Assessment and   Recommendations(SBAR).     Information from the following report(s) Intake/Output, MAR, and Recent Results was reviewed with the receiving nurse.           Lines:   Peripheral IV 03/04/24 Left;Anterior Forearm (Active)        Opportunity for questions and clarification was provided.      Patient transported with:  Registered Nurse

## 2024-03-04 NOTE — H&P
History & Physical    Name: Ginny Joyner MRN: [id number]  SSN: [id number]    YOB: 1985  Age: 38 y.o.  Sex: female      Subjective: scheduled C/S     Estimated Date of Delivery: 3/11/24  OB History          2    Para   1    Term   1            AB        Living   1         SAB        IAB        Ectopic        Molar        Multiple        Live Births   1                Ms. Joyner is a , admitted with pregnancy at 39w0d for  section due to history of  section x 1, declines TOLAC. Ethics has also approved a risk-reducing bilateral salpingectomy at the time of delivery, due to her family history of ovarian cancer, as well as her multiple medical co-morbidities that increase her risk for adverse outcomes in any future pregnancies. She is certain that she doesn't want any more children.    Pregnancy has been complicated by:  -Trichomonas infection in 1st trimester, with negative SIA and recent negative STD screening at 36 weeks  -AMA, declined Mat21  -Fetal EIF, declined Mat21  -Failed 1 hour, passed 3 hour GTT  -Hx of genital HSV, on valtrex suppression  -Hx of  section x 1, desires repeat with bilateral salpingectomy  -Resolved IUGR, followed by MFM  -PCOS  -Sarcoidosis, on symbicort, followed by pulmonology  -BMI 36    O+, Rubella Immune, GBS negative  S/p Tdap    Past Medical History:   Diagnosis Date    Asthma     bronchitis 1yr ago    Herpes simplex virus (HSV) infection     Ill-defined condition     dislocated left jaw due to [place] Palsy    Neurological disorder     [place] Palsy    Proximal phalanx fracture of finger 2010    Sarcoidosis of lung (HCC)      Past Surgical History:   Procedure Laterality Date     SECTION, LOW TRANSVERSE      LUNG BIOPSY      ORTHOPEDIC SURGERY       Social History     Occupational History    Not on file   Tobacco Use    Smoking status: Former     Current packs/day: 0.00     Types: Cigarettes     Quit date:

## 2024-03-05 LAB
ERYTHROCYTE [DISTWIDTH] IN BLOOD BY AUTOMATED COUNT: 13.1 % (ref 11.5–14.5)
HCT VFR BLD AUTO: 31.7 % (ref 35–47)
HGB BLD-MCNC: 10.1 G/DL (ref 11.5–16)
MCH RBC QN AUTO: 27.2 PG (ref 26–34)
MCHC RBC AUTO-ENTMCNC: 31.9 G/DL (ref 30–36.5)
MCV RBC AUTO: 85.4 FL (ref 80–99)
NRBC # BLD: 0 K/UL (ref 0–0.01)
NRBC BLD-RTO: 0 PER 100 WBC
PLATELET # BLD AUTO: 222 K/UL (ref 150–400)
PMV BLD AUTO: 10.6 FL (ref 8.9–12.9)
RBC # BLD AUTO: 3.71 M/UL (ref 3.8–5.2)
WBC # BLD AUTO: 8.3 K/UL (ref 3.6–11)

## 2024-03-05 PROCEDURE — 6370000000 HC RX 637 (ALT 250 FOR IP): Performed by: STUDENT IN AN ORGANIZED HEALTH CARE EDUCATION/TRAINING PROGRAM

## 2024-03-05 PROCEDURE — 36415 COLL VENOUS BLD VENIPUNCTURE: CPT

## 2024-03-05 PROCEDURE — 85027 COMPLETE CBC AUTOMATED: CPT

## 2024-03-05 PROCEDURE — 6360000002 HC RX W HCPCS: Performed by: STUDENT IN AN ORGANIZED HEALTH CARE EDUCATION/TRAINING PROGRAM

## 2024-03-05 PROCEDURE — 1120000000 HC RM PRIVATE OB

## 2024-03-05 RX ORDER — SIMETHICONE 80 MG
80 TABLET,CHEWABLE ORAL EVERY 6 HOURS PRN
Status: DISCONTINUED | OUTPATIENT
Start: 2024-03-05 | End: 2024-03-07 | Stop reason: HOSPADM

## 2024-03-05 RX ADMIN — IBUPROFEN 800 MG: 400 TABLET ORAL at 09:48

## 2024-03-05 RX ADMIN — OXYCODONE 5 MG: 5 TABLET ORAL at 12:04

## 2024-03-05 RX ADMIN — SIMETHICONE 80 MG: 80 TABLET, CHEWABLE ORAL at 18:40

## 2024-03-05 RX ADMIN — IBUPROFEN 800 MG: 400 TABLET ORAL at 18:23

## 2024-03-05 RX ADMIN — ACETAMINOPHEN 1000 MG: 500 TABLET ORAL at 20:30

## 2024-03-05 RX ADMIN — ENOXAPARIN SODIUM 40 MG: 100 INJECTION SUBCUTANEOUS at 09:57

## 2024-03-05 RX ADMIN — KETOROLAC TROMETHAMINE 30 MG: 30 INJECTION, SOLUTION INTRAMUSCULAR; INTRAVENOUS at 03:08

## 2024-03-05 RX ADMIN — ACETAMINOPHEN 1000 MG: 500 TABLET ORAL at 01:14

## 2024-03-05 RX ADMIN — OXYCODONE 5 MG: 5 TABLET ORAL at 20:31

## 2024-03-05 RX ADMIN — DOCUSATE SODIUM 100 MG: 100 CAPSULE, LIQUID FILLED ORAL at 20:30

## 2024-03-05 RX ADMIN — ACETAMINOPHEN 1000 MG: 500 TABLET ORAL at 12:04

## 2024-03-05 RX ADMIN — OXYCODONE 5 MG: 5 TABLET ORAL at 16:00

## 2024-03-05 RX ADMIN — DOCUSATE SODIUM 100 MG: 100 CAPSULE, LIQUID FILLED ORAL at 09:48

## 2024-03-05 ASSESSMENT — PAIN - FUNCTIONAL ASSESSMENT
PAIN_FUNCTIONAL_ASSESSMENT: ACTIVITIES ARE NOT PREVENTED

## 2024-03-05 ASSESSMENT — PAIN SCALES - GENERAL
PAINLEVEL_OUTOF10: 4
PAINLEVEL_OUTOF10: 5
PAINLEVEL_OUTOF10: 5
PAINLEVEL_OUTOF10: 9
PAINLEVEL_OUTOF10: 4

## 2024-03-05 ASSESSMENT — PAIN DESCRIPTION - LOCATION
LOCATION: INCISION

## 2024-03-05 ASSESSMENT — PAIN DESCRIPTION - ORIENTATION
ORIENTATION: LOWER

## 2024-03-05 ASSESSMENT — PAIN DESCRIPTION - DESCRIPTORS
DESCRIPTORS: SORE

## 2024-03-05 NOTE — PROGRESS NOTES
Post-Operative  Day 1    Ginny Joyner is a 38 y.o.  who is POD 1 s/p RLTCS + BS at 39w0d.    Patient doing well without unusual complaints. Tolerating regular diet, passing flatus. Aside from worsening pain last night, her pain has been well controlled. Only took the 1 madhuri last night, otherwise has been using tylenol and toradol. Ambulating and voiding without difficulty. Lochia normal. Breastfeeding, baby boy.    Vitals:  /76   Pulse 61   Temp 97.5 °F (36.4 °C) (Oral)   Resp 16   LMP 2023   SpO2 99%   Breastfeeding Unknown   Temp (24hrs), Av °F (36.7 °C), Min:97.5 °F (36.4 °C), Max:98.7 °F (37.1 °C)      Last 24hr Input/Output:    Intake/Output Summary (Last 24 hours) at 3/5/2024 0819  Last data filed at 3/5/2024 0114  Gross per 24 hour   Intake 1700 ml   Output 1635 ml   Net 65 ml          Exam:       Patient without distress.  Abdomen:soft, expected tenderness, fundus firm, wound dressing intact     Lower extremities are nontender without edema. No cords    Labs:   Lab Results   Component Value Date/Time    WBC 6.5 2024 02:11 PM    WBC 7.9 2024 11:50 AM    WBC 5.7 2020 02:50 PM    HGB 11.2 2024 02:11 PM    HGB 11.0 2024 11:50 AM    HGB 13.1 2020 02:50 PM    HCT 35.5 2024 02:11 PM    HCT 33.3 2024 11:50 AM    HCT 41.4 2020 02:50 PM     2024 02:11 PM     2024 11:50 AM     2020 02:50 PM       Recent Results (from the past 24 hour(s))   CBC    Collection Time: 24  2:11 PM   Result Value Ref Range    WBC 6.5 3.6 - 11.0 K/uL    RBC 4.04 3.80 - 5.20 M/uL    Hemoglobin 11.2 (L) 11.5 - 16.0 g/dL    Hematocrit 35.5 35.0 - 47.0 %    MCV 87.9 80.0 - 99.0 FL    MCH 27.7 26.0 - 34.0 PG    MCHC 31.5 30.0 - 36.5 g/dL    RDW 17.2 (H) 11.5 - 14.5 %    Platelets 221 150 - 400 K/uL    MPV 10.6 8.9 - 12.9 FL    Nucleated RBCs 0.0 0  WBC    nRBC 0.00 0.00 - 0.01 K/uL           Assessment:

## 2024-03-05 NOTE — LACTATION NOTE
This note was copied from a baby's chart.  Infant born via C/S to a  mom at 39 weeks gestation. Mom has large pendulous breasts. Assisted mom with positioning infant at the breast with deep latch. Encouraged mom to offer both breasts at every feeding session.   Feeding Plan:  Mother will keep baby skin to skin as often as possible, feed on demand, 8-12x/day , respond to feeding cues, obtain latch, listen for audible swallowing, be aware of signs of oxytocin release/ cramping,thirst,sleepiness while breastfeeding, offer both breasts,and will not limit feedings.  Mother agrees to utilize breast massage while nursing to facilitate lactogenesis.      1650 Mom attempting to latch infant at the time of my visit. He is sleepy post bath. With use of sweet ease he latched after multiple attempts. Mom's breasts are large and pendulous. Helped mom position infant in the football position, using pillows for support.

## 2024-03-05 NOTE — PROGRESS NOTES
1820 - pt complaining of 9/10 pain only oral medication ordered. Pt cannot receive toradol until 2130. Called to speak to Dr. Morris if she wanted to add in iv pain medication on top of toradol. Dr. Morris requested giving pt zofran for nausea then oral narcotic to bring pain level down. Informed pt of plan. Pt wanted to start with 5mg and then if needed more can increase to 10mg.

## 2024-03-05 NOTE — DISCHARGE INSTRUCTIONS
Postpartum Support Groups   We know that all of us are dealing with a tremendous amount of uncertainty, confusion and disruption to our daily lives, which may result in increased anxiety, depression and fear. If you are feeling unsettled or worse, please know that we are here to help. During this time of increased caution and care for one another, [facility] [place] ([facility]) is offering virtual and in person support groups to ALL MOTHERS in [place] regardless of the age of your child/children as a way to help weather this emotional storm together. Social support is an important part of self-care during this time of physical distancing.  Virtual postpartum support group meetings available at www.postpartumva.org  Warm Line: 707.165.3125    Breastfeeding Support Groups    and  of each month at [facility]   and  of each month at [facility] (in education center behind cafeteria)    [email or web address]      After Your Delivery Discharge Instructions    After Discharge Orders:  Future Appointments   Date Time Provider Department Center   2024 10:30 AM [facility]          Medical equipment: Breast pump     Call the Physician with any  signs and symptoms:    Warning signs regarding incision:  \"Popping\" of stitches or staples  Foul smelling discharge or pus  More redness or streaks around incision than before    Incision care:  Keep incision uncovered  Clean incision twice per day  No tub baths until OK'd by your Physician or Midwife  You may use cold compresses on incision site     After your delivery - signs and symptoms to watch for:  Fever - Oral temperature greater than 100.4 degrees Fahrenheit  Foul-smelling vaginal discharge  Headache unrelieved by \"pain meds\"  Difficulty urinating  Breasts reddened, hard, hot to the touch  Nipple discharge which is foul-smelling or contains pus  Increased pain at

## 2024-03-06 PROCEDURE — 1120000000 HC RM PRIVATE OB

## 2024-03-06 PROCEDURE — 6360000002 HC RX W HCPCS: Performed by: STUDENT IN AN ORGANIZED HEALTH CARE EDUCATION/TRAINING PROGRAM

## 2024-03-06 PROCEDURE — 6370000000 HC RX 637 (ALT 250 FOR IP): Performed by: STUDENT IN AN ORGANIZED HEALTH CARE EDUCATION/TRAINING PROGRAM

## 2024-03-06 RX ADMIN — SIMETHICONE 80 MG: 80 TABLET, CHEWABLE ORAL at 22:26

## 2024-03-06 RX ADMIN — OXYCODONE 5 MG: 5 TABLET ORAL at 02:27

## 2024-03-06 RX ADMIN — ACETAMINOPHEN 1000 MG: 500 TABLET ORAL at 22:26

## 2024-03-06 RX ADMIN — OXYCODONE 5 MG: 5 TABLET ORAL at 23:10

## 2024-03-06 RX ADMIN — ACETAMINOPHEN 1000 MG: 500 TABLET ORAL at 05:51

## 2024-03-06 RX ADMIN — DOCUSATE SODIUM 100 MG: 100 CAPSULE, LIQUID FILLED ORAL at 22:26

## 2024-03-06 RX ADMIN — OXYCODONE 5 MG: 5 TABLET ORAL at 10:07

## 2024-03-06 RX ADMIN — ACETAMINOPHEN 1000 MG: 500 TABLET ORAL at 14:16

## 2024-03-06 RX ADMIN — IBUPROFEN 800 MG: 400 TABLET ORAL at 10:07

## 2024-03-06 RX ADMIN — SIMETHICONE 80 MG: 80 TABLET, CHEWABLE ORAL at 16:00

## 2024-03-06 RX ADMIN — DOCUSATE SODIUM 100 MG: 100 CAPSULE, LIQUID FILLED ORAL at 10:07

## 2024-03-06 RX ADMIN — SIMETHICONE 80 MG: 80 TABLET, CHEWABLE ORAL at 10:07

## 2024-03-06 RX ADMIN — Medication 1 TABLET: at 10:07

## 2024-03-06 RX ADMIN — IBUPROFEN 800 MG: 400 TABLET ORAL at 02:27

## 2024-03-06 RX ADMIN — OXYCODONE 5 MG: 5 TABLET ORAL at 14:16

## 2024-03-06 RX ADMIN — ENOXAPARIN SODIUM 40 MG: 100 INJECTION SUBCUTANEOUS at 10:07

## 2024-03-06 RX ADMIN — SIMETHICONE 80 MG: 80 TABLET, CHEWABLE ORAL at 02:24

## 2024-03-06 RX ADMIN — IBUPROFEN 800 MG: 400 TABLET ORAL at 19:04

## 2024-03-06 RX ADMIN — OXYCODONE 5 MG: 5 TABLET ORAL at 19:04

## 2024-03-06 ASSESSMENT — PAIN DESCRIPTION - LOCATION
LOCATION: ABDOMEN;INCISION
LOCATION: INCISION
LOCATION: INCISION;ABDOMEN
LOCATION: ABDOMEN;INCISION
LOCATION: INCISION

## 2024-03-06 ASSESSMENT — PAIN DESCRIPTION - DESCRIPTORS
DESCRIPTORS: SORE
DESCRIPTORS: CRAMPING;SORE
DESCRIPTORS: SORE
DESCRIPTORS: SORE
DESCRIPTORS: SORE;CRAMPING

## 2024-03-06 ASSESSMENT — PAIN SCALES - GENERAL
PAINLEVEL_OUTOF10: 4
PAINLEVEL_OUTOF10: 5
PAINLEVEL_OUTOF10: 5
PAINLEVEL_OUTOF10: 4
PAINLEVEL_OUTOF10: 9
PAINLEVEL_OUTOF10: 4

## 2024-03-06 ASSESSMENT — PAIN - FUNCTIONAL ASSESSMENT
PAIN_FUNCTIONAL_ASSESSMENT: ACTIVITIES ARE NOT PREVENTED

## 2024-03-06 ASSESSMENT — PAIN DESCRIPTION - ORIENTATION
ORIENTATION: LOWER

## 2024-03-06 NOTE — PROGRESS NOTES
Post-Operative  Day 2    Ginny Joyner is a 38 y.o.  who is POD 2 s/p RLTCS + BS at 39w0d.    Patient doing well without unusual complaints. Passing flatus, voiding and ambulating without difficulty. Tolerating regular diet. Pain has been worse with movement. Breastfeeding.    Vitals:  /62   Pulse 67   Temp 97.6 °F (36.4 °C) (Oral)   Resp 16   LMP 2023   SpO2 99%   Breastfeeding Unknown   Temp (24hrs), Av.9 °F (36.6 °C), Min:97.6 °F (36.4 °C), Max:98.2 °F (36.8 °C)      Exam:      Patient without distress.               Abdomen: soft, expected tenderness, fundus firm              Wound dressing removed, incision healing well with no areas of dehiscence, no drainage. No surrounding warmth or erythema.               Lower extremities are nontender without edema.  No cords    Labs:   Lab Results   Component Value Date/Time    WBC 8.3 2024 06:25 AM    WBC 6.5 2024 02:11 PM    WBC 7.9 2024 11:50 AM    WBC 5.7 2020 02:50 PM    HGB 10.1 2024 06:25 AM    HGB 11.2 2024 02:11 PM    HGB 11.0 2024 11:50 AM    HGB 13.1 2020 02:50 PM    HCT 31.7 2024 06:25 AM    HCT 35.5 2024 02:11 PM    HCT 33.3 2024 11:50 AM    HCT 41.4 2020 02:50 PM     2024 06:25 AM     2024 02:11 PM     2024 11:50 AM     2020 02:50 PM       No results found for this or any previous visit (from the past 24 hour(s)).      Assessment: Post-Op day 2, doing well  Acute blood loss anemia: post-op Hgb 10.1, appropriate decrease from 11.2. Asymptomatic.  O+, Rubella immune, GBS negative  Baby boy, s/p circumcision    Plan:   1. Routine post-operative care  2. Plan for discharge home tomorrow  3. Continue to work with lactation today      Geri Morris MD  3/6/2024  8:01 AM

## 2024-03-07 ENCOUNTER — CARE COORDINATION (OUTPATIENT)
Dept: OTHER | Facility: CLINIC | Age: 39
End: 2024-03-07

## 2024-03-07 ENCOUNTER — LACTATION ENCOUNTER (OUTPATIENT)
Facility: HOSPITAL | Age: 39
End: 2024-03-07

## 2024-03-07 VITALS
HEART RATE: 67 BPM | SYSTOLIC BLOOD PRESSURE: 113 MMHG | RESPIRATION RATE: 16 BRPM | TEMPERATURE: 97.9 F | OXYGEN SATURATION: 96 % | DIASTOLIC BLOOD PRESSURE: 74 MMHG

## 2024-03-07 PROCEDURE — 6360000002 HC RX W HCPCS: Performed by: STUDENT IN AN ORGANIZED HEALTH CARE EDUCATION/TRAINING PROGRAM

## 2024-03-07 PROCEDURE — 6370000000 HC RX 637 (ALT 250 FOR IP): Performed by: STUDENT IN AN ORGANIZED HEALTH CARE EDUCATION/TRAINING PROGRAM

## 2024-03-07 RX ORDER — IBUPROFEN 800 MG/1
800 TABLET ORAL EVERY 8 HOURS SCHEDULED
Qty: 30 TABLET | Refills: 0 | Status: SHIPPED | OUTPATIENT
Start: 2024-03-07

## 2024-03-07 RX ORDER — OXYCODONE HYDROCHLORIDE 5 MG/1
5 TABLET ORAL EVERY 4 HOURS PRN
Qty: 20 TABLET | Refills: 0 | Status: SHIPPED | OUTPATIENT
Start: 2024-03-07 | End: 2024-03-14

## 2024-03-07 RX ORDER — PSEUDOEPHEDRINE HCL 30 MG
100 TABLET ORAL 2 TIMES DAILY
Qty: 90 CAPSULE | Refills: 3 | Status: SHIPPED | OUTPATIENT
Start: 2024-03-07

## 2024-03-07 RX ADMIN — IBUPROFEN 800 MG: 400 TABLET ORAL at 13:22

## 2024-03-07 RX ADMIN — OXYCODONE 5 MG: 5 TABLET ORAL at 03:06

## 2024-03-07 RX ADMIN — DOCUSATE SODIUM 100 MG: 100 CAPSULE, LIQUID FILLED ORAL at 09:15

## 2024-03-07 RX ADMIN — ACETAMINOPHEN 1000 MG: 500 TABLET ORAL at 13:23

## 2024-03-07 RX ADMIN — Medication 1 TABLET: at 09:15

## 2024-03-07 RX ADMIN — ENOXAPARIN SODIUM 40 MG: 100 INJECTION SUBCUTANEOUS at 09:15

## 2024-03-07 RX ADMIN — IBUPROFEN 800 MG: 400 TABLET ORAL at 03:06

## 2024-03-07 RX ADMIN — OXYCODONE 5 MG: 5 TABLET ORAL at 16:01

## 2024-03-07 RX ADMIN — ACETAMINOPHEN 1000 MG: 500 TABLET ORAL at 06:11

## 2024-03-07 ASSESSMENT — PAIN DESCRIPTION - ORIENTATION
ORIENTATION: LOWER

## 2024-03-07 ASSESSMENT — PAIN SCALES - GENERAL
PAINLEVEL_OUTOF10: 9
PAINLEVEL_OUTOF10: 7
PAINLEVEL_OUTOF10: 5
PAINLEVEL_OUTOF10: 7
PAINLEVEL_OUTOF10: 5

## 2024-03-07 ASSESSMENT — PAIN - FUNCTIONAL ASSESSMENT
PAIN_FUNCTIONAL_ASSESSMENT: ACTIVITIES ARE NOT PREVENTED

## 2024-03-07 ASSESSMENT — PAIN DESCRIPTION - DESCRIPTORS
DESCRIPTORS: SORE
DESCRIPTORS: SORE;CRAMPING
DESCRIPTORS: SORE

## 2024-03-07 ASSESSMENT — PAIN DESCRIPTION - LOCATION
LOCATION: INCISION
LOCATION: ABDOMEN
LOCATION: INCISION;ABDOMEN
LOCATION: ABDOMEN
LOCATION: ABDOMEN;INCISION

## 2024-03-07 NOTE — DISCHARGE SUMMARY
Obstetrical Discharge Form    Primary OB Clinician: Geri Morris MD     EDC: Estimated Date of Delivery: 3/11/24    Gestational Age:39w0d    Antepartum complications: Hx c/s x 1, AMA, fetal EIF, failed 1 hour/passed 3 hour GTT, hx genital HSV, resolved IUGR, PCOS, sarcoidosis, trichomonas infection in 1st trimester, BMI 36    Date of Delivery: 3/4/24 ; Time of Delivery: 1514     Delivered By: Geri Morris MD    Delivery Type: repeat  section, low transverse incision, with bilateral salpingectomy    Tubal Ligation: done with c/section    Baby: Liveborn male, Apgars 9/9, weight 3210g    Anesthesia: Spinal    Intrapartum complications: None    Laceration: n/a    Episiotomy: n/a    Placenta: spontaneous    Feeding method: breast    Rh Immune globulin given: not applicable    Rubella vaccine given: not applicable    Discharge Date: 3/7/24    Plan:     Address and phone number verified and same.  Follow-up appointment with Dr. Morris in 2 weeks.      Geri Morris MD  3/7/2024  8:16 AM

## 2024-03-07 NOTE — PROGRESS NOTES
Patient declines lactation consult at this time.  Patient states she is doing well and is comfortable with her feeding/pumping plan.  She states she would like to see lactation in the morning prior to discharge.

## 2024-03-07 NOTE — PROGRESS NOTES
Post-Operative  Day 3    Ginny Joyner is a 38 y.o.  who is POD 3 s/p RLTCS + BS at 39w0d.    Patient doing well without significant complaints. Tolerating diet, passing flatus, voiding and ambulating without difficulty. Pain well controlled. Breastfeeding.    Vitals:  /63   Pulse 73   Temp 98 °F (36.7 °C) (Oral)   Resp 16   LMP 2023   SpO2 97%   Breastfeeding Unknown   Temp (24hrs), Av °F (36.7 °C), Min:97.5 °F (36.4 °C), Max:98.3 °F (36.8 °C)        Exam:      Patient without distress.               Abdomen: soft, expected tenderness, fundus firm              Incision healing well, no drainage or dehiscence.               Lower extremities are nontender without edema.  No cords    Labs:   Lab Results   Component Value Date/Time    WBC 8.3 2024 06:25 AM    WBC 6.5 2024 02:11 PM    WBC 7.9 2024 11:50 AM    WBC 5.7 2020 02:50 PM    HGB 10.1 2024 06:25 AM    HGB 11.2 2024 02:11 PM    HGB 11.0 2024 11:50 AM    HGB 13.1 2020 02:50 PM    HCT 31.7 2024 06:25 AM    HCT 35.5 2024 02:11 PM    HCT 33.3 2024 11:50 AM    HCT 41.4 2020 02:50 PM     2024 06:25 AM     2024 02:11 PM     2024 11:50 AM     2020 02:50 PM       No results found for this or any previous visit (from the past 24 hour(s)).      Assessment: Post-Op day 3, doing well  Acute blood loss anemia: post-op Hgb 10.1, appropriate decrease from 11.2. Asymptomatic.  O+, Rubella immune, GBS negative  Baby boy, s/p circumcision    Plan:   1. Discharge home today  2. Instructions given- pelvic rest, restrictions on driving and lifting, wound care instructions, follow-up  3. Discharge Medications: see discharge instruction sheet       Geri Morris MD  3/7/2024  8:07 AM

## 2024-03-07 NOTE — CARE COORDINATION
Received dc notification on this pt. Pt currently admitted will place a call once she is dc from the hospital.

## 2024-03-07 NOTE — LACTATION NOTE
This note was copied from a baby's chart.  Baby with 11% wt loss is nursing well today,  Mother's milk is in, deep latch obtained, mother is comfortable, baby feeding vigorously with rhythmic suck, swallow, breathe pattern, audible swallowing, and evident milk transfer, both breasts offered, baby is asleep following feeding.   Mother attempted pumping but found milk spilling and found it frustrating, stating \"that is not for me right now\"   Mother offering donor milk as supplement.    I observed baby audibly gulping at breast and then struggled to take another 15 ml of donor milk.  Baby very full after feeding.  Repeat wt pending prior to discharge

## 2024-03-08 ENCOUNTER — CARE COORDINATION (OUTPATIENT)
Dept: OTHER | Facility: CLINIC | Age: 39
End: 2024-03-08

## 2024-03-08 ENCOUNTER — LACTATION ENCOUNTER (OUTPATIENT)
Facility: HOSPITAL | Age: 39
End: 2024-03-08

## 2024-03-08 NOTE — LACTATION NOTE
This note was copied from a baby's chart.  Mother's milk is in. Baby is at an 11.53% weight loss. Mother is nursing baby and then supplementing with Neosure. Mother last breast fed baby on left breast for 15 minutes at 2115. Mother reports audible \"gulps heard\". Mother will call this IBCLC back for the next feeding to observe the latch.      2340: Mother called this IBCLC in to help with latching baby to the left breast. Assisted mother in deeply latching baby to the left breast. Audible swallows heard. Mother was able to hand pump 8mls of breast milk which she will feed to the baby after breast feeding.

## 2024-03-08 NOTE — LACTATION NOTE
This note was copied from a baby's chart.  Baby scheduled for discharge today. Mom states baby has been latching better. She feels like her milk is coming in. Baby had some increased weight loss and was getting donor milk but the weight was not increasing as expected and he was started on formula. Mom has been pumping and is now beginning to collect her own breast milk to give to the baby.     Baby's weight is increasing. She will continue to put baby to the breast but will supplement with some formula or her own breast milk. She will be following up with her pediatrician tomorrow.

## 2024-03-08 NOTE — CARE COORDINATION
Call within 2 business days of discharge: No     Patient Current Location: [place]    Last Discharge Facility       Date Complaint Diagnosis Description Type Department Provider    3/4/24 Scheduled  History of  section Admission (Discharged) [id number] Bailey MD       Maternity Care Manager contacted the patient by telephone to discuss the maternity management program.  Patient agrees to care management services at this time. Verified name and  with patient as identifiers.     Risk Factors Identified:  repeat c/s      Needs to be reviewed by the provider   none         Method of communication with provider : none    Advance Care Planning:   Does patient have an Advance Directive:  not on file.     Does patient have OB/Gyn Selected? Yes    Discussed follow up appointments. If no appointment was previously scheduled, appointment scheduling offered: Yes  [facility] follow up appointment(s):   Future Appointments   Date Time Provider Department Center   2024 10:30 AM [facility] CT 1 SFMRCT [facility]     Non-[facility] follow up appointment(s):     OB History:   OB History    Para Term  AB Living   2 2 2     2   SAB IAB Ectopic Molar Multiple Live Births           0 2      # Outcome Date GA Lbr Jorge/2nd Weight Sex Delivery Anes PTL Lv   2 Term 24 39w0d  3.21 kg (7 lb 1.2 oz) M CS-LTranv Spinal N GUY   1 Term 18 41w0d  3.538 kg (7 lb 12.8 oz) F CS-LTranv Spinal N GUY       39w0d    Medication reconciliation was performed with patient, who verbalizes understanding of administration of home medications. Advised obtaining a 90-day supply of all daily and as-needed medications.     Barriers/Support system:  mother  Return to work planning? Yes      Postpartum Assessment: repeat c/s 3/4    Lochia-normal  Incision-c/d/i  Fever No  BM? No   Decreased urinary output No  Visual changes No  Calf pain No  Headache No  Swelling No  Breast Pain No  Depression or feelings of sadness

## 2024-03-12 ENCOUNTER — CARE COORDINATION (OUTPATIENT)
Dept: OTHER | Facility: CLINIC | Age: 39
End: 2024-03-12

## 2024-03-12 NOTE — CARE COORDINATION
MSW Documentation    MSW contacted patient for follow up.   Patient referred by Nurse JAMIE, Alpa Momin RN.      Purpose of TC  MSW outreach to patient to follow up on previous discussion(s) and resources provided     TC Details  MSW unable to reach, discreet VM left requesting return call.       Plan of action  MSW to await return call; MSW to attempt outreach again.

## 2024-03-18 ENCOUNTER — CARE COORDINATION (OUTPATIENT)
Dept: OTHER | Facility: CLINIC | Age: 39
End: 2024-03-18

## 2024-03-18 NOTE — CARE COORDINATION
[facility] attempted to reach patient for Maternity Care Management/ Care transitions call. HIPAA compliant message left requesting a return phone call at patients convenience. Will continue to follow.       Call within 2 business days of discharge: Yes     Patient Current Location: [place]    Last Discharge Facility       Date Complaint Diagnosis Description Type Department Provider    3/4/24 Scheduled  History of  section Admission (Discharged) [id number] Sarah Morris MD       Maternity Care Manager contacted the patient by telephone to discuss the maternity management program.  Patient agrees to care management services at this time. Verified name and  with patient as identifiers.     Risk Factors Identified:  repeat c/s      Needs to be reviewed by the provider   none         Method of communication with provider : none    Advance Care Planning:   Does patient have an Advance Directive:  not on file.     Does patient have OB/Gyn Selected? Yes    Discussed follow up appointments. If no appointment was previously scheduled, appointment scheduling offered: Yes  [facility] follow up appointment(s):   Future Appointments   Date Time Provider Department Center   2024 10:30 AM [facility] CT 1 SFMRCT [facility]     Non-[facility] follow up appointment(s): 24    OB History:   OB History    Para Term  AB Living   2 2 2     2   SAB IAB Ectopic Molar Multiple Live Births           0 2      # Outcome Date GA Lbr Jorge/2nd Weight Sex Delivery Anes PTL Lv   2 Term 24 39w0d  3.21 kg (7 lb 1.2 oz) M CS-LTranv Spinal N GUY   1 Term 18 41w0d  3.538 kg (7 lb 12.8 oz) F CS-LTranv Spinal N GUY       39w0d    Medication reconciliation was performed with patient, who verbalizes understanding of administration of home medications. Advised obtaining a 90-day supply of all daily and as-needed medications.     Barriers/Support system:  patient and partner  Return to work planning? Yes      Postpartum

## 2024-03-18 NOTE — CARE COORDINATION
MSW Documentation    MSW contacted patient for follow up.  Patient referred by Nurse JAMIE, Alpa Momin RN.        Purpose of TC  MSW outreach to patient to follow up on previous discussion(s) and resources provided.        TC Details  MSW unable to reach, x3 attempts, discreet voicemail left requesting return call.      Plan of action  MSW to await return call; MSW to sign off EOD if return call not received.    MSW remains available to assist as needed, please re-refer as needed.     MSW provided contact information for future needs. No further follow-up call indicated

## 2024-03-19 NOTE — CARE COORDINATION
MSW received return voicemail from patient when this worker not in office, patient reported available typically 8-5pm. MSW outreach again today: unable to reach. MSW to await return call; MSW to sign off EOD if not received.

## 2024-04-30 ENCOUNTER — OFFICE VISIT (OUTPATIENT)
Age: 39
End: 2024-04-30

## 2024-04-30 VITALS
WEIGHT: 197 LBS | HEART RATE: 93 BPM | OXYGEN SATURATION: 99 % | SYSTOLIC BLOOD PRESSURE: 136 MMHG | TEMPERATURE: 98.4 F | BODY MASS INDEX: 32.78 KG/M2 | DIASTOLIC BLOOD PRESSURE: 86 MMHG

## 2024-04-30 DIAGNOSIS — J01.00 ACUTE NON-RECURRENT MAXILLARY SINUSITIS: Primary | ICD-10-CM

## 2024-04-30 RX ORDER — AMOXICILLIN 250 MG/5ML
875 POWDER, FOR SUSPENSION ORAL 2 TIMES DAILY
Qty: 350 ML | Refills: 0 | Status: SHIPPED | OUTPATIENT
Start: 2024-04-30 | End: 2024-05-10

## 2024-04-30 NOTE — PATIENT INSTRUCTIONS
Sinusitis -  Amoxicillin x 10 days  Drink plenty fluids  Can take OTC cough/cold medicines, recommend she check with her ob/gyn for safety while breastfeeding  Call or return to clinic if no improvement or any worsening

## 2024-04-30 NOTE — PROGRESS NOTES
Ginny Joyner (:  1985) is a 39 y.o. female,Established patient, here for evaluation of the following chief complaint(s):  Congestion (Sinus congestion, coughing, x 1 week)        SUBJECTIVE/OBJECTIVE:    History provided by:  Patient       39 y.o. female presents with symptoms of cough, sinus pressure x 1 week. Patient states started as a cold, getting better then worse, now admits to cough productive of clear sputum, sinus pressure in her head and under her eyes, headache, nasal congestion, some ear pain. No fevers. She is currently breastfeeding. No sore throat. Has been trying teas, drinking plenty fluids. Patient request oral suspension for meds, cannot tolerate pills.         Vitals:    24 0911   BP: 136/86   Site: Left Upper Arm   Position: Sitting   Cuff Size: Medium Adult   Pulse: 93   Temp: 98.4 °F (36.9 °C)   TempSrc: Oral   SpO2: 99%   Weight: 89.4 kg (197 lb)       No results found for this visit on 24.     Physical Exam  Constitutional:       General: She is not in acute distress.     Appearance: Normal appearance.   HENT:      Head: Normocephalic and atraumatic.      Right Ear: Tympanic membrane, ear canal and external ear normal.      Left Ear: Tympanic membrane, ear canal and external ear normal.      Nose:      Right Sinus: Maxillary sinus tenderness present. No frontal sinus tenderness.      Left Sinus: No maxillary sinus tenderness or frontal sinus tenderness.      Comments: Tender over right maxillary sinus.     Mouth/Throat:      Mouth: Mucous membranes are moist.   Eyes:      General:         Right eye: No discharge.         Left eye: No discharge.      Conjunctiva/sclera: Conjunctivae normal.   Cardiovascular:      Rate and Rhythm: Normal rate and regular rhythm.      Heart sounds: No murmur heard.  Pulmonary:      Effort: Pulmonary effort is normal. No respiratory distress.      Breath sounds: Normal breath sounds.   Skin:     General: Skin is warm and dry.

## 2024-05-02 ENCOUNTER — CARE COORDINATION (OUTPATIENT)
Dept: OTHER | Facility: CLINIC | Age: 39
End: 2024-05-02

## 2024-05-02 NOTE — CARE COORDINATION
Call within 2 business days of discharge: Yes     Patient Current Location: [place]    Last Discharge Facility       Date Complaint Diagnosis Description Type Department Provider    3/4/24 Scheduled  History of  section Admission (Discharged) [id number] Bailey MD     Maternity Care Manager contacted the patient by telephone to discuss the maternity management program.  Patient agrees to care management services at this time. Verified name and  with patient as identifiers.     Risk Factors Identified:         Needs to be reviewed by the provider   none         Method of communication with provider : none    Advance Care Planning:   Does patient have an Advance Directive:  not on file.     Does patient have OB/Gyn Selected? Yes    Discussed follow up appointments. If no appointment was previously scheduled, appointment scheduling offered: Yes  [facility] follow up appointment(s):   Future Appointments   Date Time Provider Department Center   2024 10:30 AM [facility] CT 1 SFMRCT [facility]     Non-[facility] follow up appointment(s):     OB History:   OB History    Para Term  AB Living   2 2 2     2   SAB IAB Ectopic Molar Multiple Live Births           0 2      # Outcome Date GA Lbr Jorge/2nd Weight Sex Delivery Anes PTL Lv   2 Term 24 39w0d  3.21 kg (7 lb 1.2 oz) M CS-LTranv Spinal N GUY   1 Term 18 41w0d  3.538 kg (7 lb 12.8 oz) F CS-LTranv Spinal N GUY       Unknown    Medication reconciliation was performed with patient, who verbalizes understanding of administration of home medications. Advised obtaining a 90-day supply of all daily and as-needed medications.     Barriers/Support system:  mother  Return to work planning? Yes          Postpartum Assessment:  S/w pt today she is doing well she is now 8w pp. Her 6w pp visit went well and she was released back to regular activities. The pt is still breast feeding with no supply concerns currently. The baby is gaining weight

## 2024-05-16 ENCOUNTER — CARE COORDINATION (OUTPATIENT)
Dept: OTHER | Facility: CLINIC | Age: 39
End: 2024-05-16

## 2024-06-08 ENCOUNTER — HOSPITAL ENCOUNTER (OUTPATIENT)
Facility: HOSPITAL | Age: 39
End: 2024-06-08
Attending: INTERNAL MEDICINE
Payer: COMMERCIAL

## 2024-06-08 DIAGNOSIS — D86.9 SARCOID: ICD-10-CM

## 2024-06-08 PROCEDURE — 6360000004 HC RX CONTRAST MEDICATION: Performed by: INTERNAL MEDICINE

## 2024-06-08 PROCEDURE — 71260 CT THORAX DX C+: CPT

## 2024-06-08 RX ADMIN — IOPAMIDOL 100 ML: 755 INJECTION, SOLUTION INTRAVENOUS at 10:44

## 2024-06-20 ENCOUNTER — CARE COORDINATION (OUTPATIENT)
Dept: OTHER | Facility: CLINIC | Age: 39
End: 2024-06-20

## 2024-09-01 ENCOUNTER — HOSPITAL ENCOUNTER (OUTPATIENT)
Facility: HOSPITAL | Age: 39
Discharge: HOME | End: 2024-09-04
Attending: SPECIALIST
Payer: COMMERCIAL

## 2024-09-01 DIAGNOSIS — J32.0 CHRONIC MAXILLARY SINUSITIS: ICD-10-CM

## 2024-09-01 DIAGNOSIS — J34.2 DEVIATED NASAL SEPTUM: ICD-10-CM

## 2024-09-01 PROCEDURE — 70486 CT MAXILLOFACIAL W/O DYE: CPT

## 2025-01-22 ENCOUNTER — TRANSCRIBE ORDERS (OUTPATIENT)
Facility: HOSPITAL | Age: 40
End: 2025-01-22

## 2025-01-22 ENCOUNTER — ANESTHESIA EVENT (OUTPATIENT)
Facility: HOSPITAL | Age: 40
End: 2025-01-22
Payer: COMMERCIAL

## 2025-01-22 DIAGNOSIS — D86.9 SARCOID: Primary | ICD-10-CM

## 2025-01-22 NOTE — PERIOP NOTE
[facility] [place]note 2[place]                   [address] [place] [address]   PRE-ADMISSION TESTING    [phone]     Surgery Date:  1/23/2025       Is surgery arrival time given by surgeon?  NO  If “NO”, [facility] staff will call you between 3 and 7pm the day before your surgery with your arrival time. (If your surgery is on a Monday, we will call you the Friday before.)    Call [phone] after 7pm Monday-Friday if you did not receive this call.    INSTRUCTIONS BEFORE YOUR SURGERY   When You  Arrive    Arrive at the 2nd Floor Admitting Desk on the day of your surgery     Have your insurance card, photo ID, and any copayment (if needed)   Food   and   Drink    No food or drink (gum, mints, coffee, juice, etc) after midnight the night before surgery.      You may drink WATER ONLY up until 2 hours prior to your surgery time. Please do not       add anything to your water as this may result in your surgery being postponed.        No alcohol (beer, wine, liquor) 24 hours before or after surgery.    Medications to TAKE Morning of Surgery      MEDICATIONS TO TAKE THE MORNING OF SURGERY: Breztri    If needed you may also take: Tylenol, Proventil, Albuterol neb treatment, Flonase, Zyrtec    Please bring your rescue inhaler with you to the hospital tomorrow     Medications to STOP 7 Days Before Surgery Non-Steroidal anti-inflammatory Drugs (NSAID's): for example: Ibuprofen, Advil, Motrin, Naproxen, Aleve  Aspirin, if taking for pain  Herbal supplements, vitamins, and fish oil  Other:   Blood Thinners    If you take Aspirin, Plavix, Coumadin, or any blood-thinning or anti blood-clotting medicine, talk to the doctor who prescribed the medications for pre-operative instructions.    Bathing   Clothing  Jewelry  Valuables       If you shower the morning of surgery, please do not apply anything to your skin (lotions, powders, deodorant, or makeup, especially mascara)  Follow Chlorhexidine Care Fusion

## 2025-01-22 NOTE — PERIOP NOTE
Hello,     You are scheduled to have surgery tomorrow at [facility].     We would like for you to arrive at  0845 am  We are located on the second floor, [address]. You will check-in at the registration desk located outside the elevators on the second floor prior to proceeding to [address].  Remember nothing to eat or drink after midnight. If you need to take medications the morning of surgery, please take with a few sips of water.   Wear loose, comfortable clothing and leave all your jewelry at home.   You may bring your cell phone with you.  One family member will be allowed in the pre-op area once you are dressed and your IV has been started.   You will need someone to drive you home and be with you for 24 hours post-anesthesia.     We look forward to seeing you! Call [phone] for questions after hours and [phone] between 5:30AM and 6PM.     Thanks!    [facility] PREOP TEAM

## 2025-01-22 NOTE — PERIOP NOTE
Call received from Dr. Peterson's office stating that patient call requesting arrival time for surgery due to transportation. Spoke with Niki RN in [facility] pre op at the moment patient should arrive at 0845. Called patient to inform her of arrival time but stated that pre op will call later today to confirm time.

## 2025-01-23 ENCOUNTER — ANESTHESIA (OUTPATIENT)
Facility: HOSPITAL | Age: 40
End: 2025-01-23
Payer: COMMERCIAL

## 2025-01-23 ENCOUNTER — HOSPITAL ENCOUNTER (OUTPATIENT)
Facility: HOSPITAL | Age: 40
Setting detail: OUTPATIENT SURGERY
Discharge: HOME | End: 2025-01-23
Attending: SPECIALIST | Admitting: SPECIALIST
Payer: COMMERCIAL

## 2025-01-23 VITALS
WEIGHT: 225.53 LBS | TEMPERATURE: 98.4 F | SYSTOLIC BLOOD PRESSURE: 133 MMHG | DIASTOLIC BLOOD PRESSURE: 73 MMHG | OXYGEN SATURATION: 97 % | BODY MASS INDEX: 37.58 KG/M2 | RESPIRATION RATE: 16 BRPM | HEART RATE: 76 BPM | HEIGHT: 65 IN

## 2025-01-23 DIAGNOSIS — J32.9 CHRONIC SINUSITIS, UNSPECIFIED LOCATION: ICD-10-CM

## 2025-01-23 LAB — HCG UR QL: NEGATIVE

## 2025-01-23 PROCEDURE — 3700000000 HC ANESTHESIA ATTENDED CARE: Performed by: SPECIALIST

## 2025-01-23 PROCEDURE — 6370000000 HC RX 637 (ALT 250 FOR IP): Performed by: SPECIALIST

## 2025-01-23 PROCEDURE — 2580000003 HC RX 258: Performed by: ANESTHESIOLOGY

## 2025-01-23 PROCEDURE — 81025 URINE PREGNANCY TEST: CPT

## 2025-01-23 PROCEDURE — 2709999900 HC NON-CHARGEABLE SUPPLY: Performed by: SPECIALIST

## 2025-01-23 PROCEDURE — 88312 SPECIAL STAINS GROUP 1: CPT

## 2025-01-23 PROCEDURE — 7100000001 HC PACU RECOVERY - ADDTL 15 MIN: Performed by: SPECIALIST

## 2025-01-23 PROCEDURE — 6360000002 HC RX W HCPCS: Performed by: NURSE ANESTHETIST, CERTIFIED REGISTERED

## 2025-01-23 PROCEDURE — 3600000004 HC SURGERY LEVEL 4 BASE: Performed by: SPECIALIST

## 2025-01-23 PROCEDURE — 2720000010 HC SURG SUPPLY STERILE: Performed by: SPECIALIST

## 2025-01-23 PROCEDURE — 2500000003 HC RX 250 WO HCPCS: Performed by: NURSE ANESTHETIST, CERTIFIED REGISTERED

## 2025-01-23 PROCEDURE — 88305 TISSUE EXAM BY PATHOLOGIST: CPT

## 2025-01-23 PROCEDURE — 6360000002 HC RX W HCPCS: Performed by: ANESTHESIOLOGY

## 2025-01-23 PROCEDURE — 3700000001 HC ADD 15 MINUTES (ANESTHESIA): Performed by: SPECIALIST

## 2025-01-23 PROCEDURE — 7100000010 HC PHASE II RECOVERY - FIRST 15 MIN: Performed by: SPECIALIST

## 2025-01-23 PROCEDURE — 3600000014 HC SURGERY LEVEL 4 ADDTL 15MIN: Performed by: SPECIALIST

## 2025-01-23 PROCEDURE — 7100000000 HC PACU RECOVERY - FIRST 15 MIN: Performed by: SPECIALIST

## 2025-01-23 RX ORDER — DIPHENHYDRAMINE HYDROCHLORIDE 50 MG/ML
12.5 INJECTION INTRAMUSCULAR; INTRAVENOUS
Status: DISCONTINUED | OUTPATIENT
Start: 2025-01-23 | End: 2025-01-23 | Stop reason: HOSPADM

## 2025-01-23 RX ORDER — ONDANSETRON 2 MG/ML
INJECTION INTRAMUSCULAR; INTRAVENOUS
Status: DISCONTINUED | OUTPATIENT
Start: 2025-01-23 | End: 2025-01-23 | Stop reason: SDUPTHER

## 2025-01-23 RX ORDER — NALOXONE HYDROCHLORIDE 0.4 MG/ML
INJECTION, SOLUTION INTRAMUSCULAR; INTRAVENOUS; SUBCUTANEOUS PRN
Status: DISCONTINUED | OUTPATIENT
Start: 2025-01-23 | End: 2025-01-23 | Stop reason: HOSPADM

## 2025-01-23 RX ORDER — PROPOFOL 10 MG/ML
INJECTION, EMULSION INTRAVENOUS
Status: DISCONTINUED | OUTPATIENT
Start: 2025-01-23 | End: 2025-01-23 | Stop reason: SDUPTHER

## 2025-01-23 RX ORDER — HYDROCODONE BITARTRATE AND ACETAMINOPHEN 5; 325 MG/1; MG/1
2 TABLET ORAL EVERY 6 HOURS PRN
Qty: 20 TABLET | Refills: 0 | Status: SHIPPED | OUTPATIENT
Start: 2025-01-23 | End: 2025-01-30

## 2025-01-23 RX ORDER — DEXAMETHASONE SODIUM PHOSPHATE 4 MG/ML
INJECTION, SOLUTION INTRA-ARTICULAR; INTRALESIONAL; INTRAMUSCULAR; INTRAVENOUS; SOFT TISSUE
Status: DISCONTINUED | OUTPATIENT
Start: 2025-01-23 | End: 2025-01-23 | Stop reason: SDUPTHER

## 2025-01-23 RX ORDER — PREDNISONE 20 MG/1
40 TABLET ORAL DAILY
Qty: 16 TABLET | Refills: 0 | Status: SHIPPED | OUTPATIENT
Start: 2025-01-23 | End: 2025-01-31

## 2025-01-23 RX ORDER — ONDANSETRON 2 MG/ML
4 INJECTION INTRAMUSCULAR; INTRAVENOUS
Status: DISCONTINUED | OUTPATIENT
Start: 2025-01-23 | End: 2025-01-23 | Stop reason: HOSPADM

## 2025-01-23 RX ORDER — DEXMEDETOMIDINE HYDROCHLORIDE 100 UG/ML
INJECTION, SOLUTION INTRAVENOUS
Status: DISCONTINUED | OUTPATIENT
Start: 2025-01-23 | End: 2025-01-23 | Stop reason: SDUPTHER

## 2025-01-23 RX ORDER — SODIUM CHLORIDE, SODIUM LACTATE, POTASSIUM CHLORIDE, CALCIUM CHLORIDE 600; 310; 30; 20 MG/100ML; MG/100ML; MG/100ML; MG/100ML
INJECTION, SOLUTION INTRAVENOUS CONTINUOUS
Status: DISCONTINUED | OUTPATIENT
Start: 2025-01-23 | End: 2025-01-23 | Stop reason: HOSPADM

## 2025-01-23 RX ORDER — FENTANYL CITRATE 50 UG/ML
INJECTION, SOLUTION INTRAMUSCULAR; INTRAVENOUS
Status: DISCONTINUED | OUTPATIENT
Start: 2025-01-23 | End: 2025-01-23 | Stop reason: SDUPTHER

## 2025-01-23 RX ORDER — MIDAZOLAM HYDROCHLORIDE 1 MG/ML
INJECTION, SOLUTION INTRAMUSCULAR; INTRAVENOUS
Status: DISCONTINUED | OUTPATIENT
Start: 2025-01-23 | End: 2025-01-23 | Stop reason: SDUPTHER

## 2025-01-23 RX ORDER — HYDROCODONE BITARTRATE AND ACETAMINOPHEN 5; 325 MG/1; MG/1
1 TABLET ORAL ONCE
Status: COMPLETED | OUTPATIENT
Start: 2025-01-23 | End: 2025-01-23

## 2025-01-23 RX ORDER — ROCURONIUM BROMIDE 10 MG/ML
INJECTION, SOLUTION INTRAVENOUS
Status: DISCONTINUED | OUTPATIENT
Start: 2025-01-23 | End: 2025-01-23 | Stop reason: SDUPTHER

## 2025-01-23 RX ORDER — LIDOCAINE HYDROCHLORIDE 10 MG/ML
1 INJECTION, SOLUTION EPIDURAL; INFILTRATION; INTRACAUDAL; PERINEURAL
Status: DISCONTINUED | OUTPATIENT
Start: 2025-01-23 | End: 2025-01-23 | Stop reason: HOSPADM

## 2025-01-23 RX ORDER — SODIUM CHLORIDE 9 MG/ML
INJECTION, SOLUTION INTRAVENOUS CONTINUOUS
Status: DISCONTINUED | OUTPATIENT
Start: 2025-01-23 | End: 2025-01-23 | Stop reason: HOSPADM

## 2025-01-23 RX ORDER — FENTANYL CITRATE 50 UG/ML
100 INJECTION, SOLUTION INTRAMUSCULAR; INTRAVENOUS
Status: DISCONTINUED | OUTPATIENT
Start: 2025-01-23 | End: 2025-01-23 | Stop reason: HOSPADM

## 2025-01-23 RX ORDER — MIDAZOLAM HYDROCHLORIDE 2 MG/2ML
2 INJECTION, SOLUTION INTRAMUSCULAR; INTRAVENOUS
Status: DISCONTINUED | OUTPATIENT
Start: 2025-01-23 | End: 2025-01-23 | Stop reason: HOSPADM

## 2025-01-23 RX ADMIN — DEXMEDETOMIDINE 10 MCG: 100 INJECTION, SOLUTION INTRAVENOUS at 11:45

## 2025-01-23 RX ADMIN — FENTANYL CITRATE 50 MCG: 50 INJECTION, SOLUTION INTRAMUSCULAR; INTRAVENOUS at 10:31

## 2025-01-23 RX ADMIN — FENTANYL CITRATE 150 MCG: 50 INJECTION, SOLUTION INTRAMUSCULAR; INTRAVENOUS at 10:33

## 2025-01-23 RX ADMIN — HYDROCODONE BITARTRATE AND ACETAMINOPHEN 1 TABLET: 5; 325 TABLET ORAL at 13:54

## 2025-01-23 RX ADMIN — HYDROMORPHONE HYDROCHLORIDE 0.5 MG: 1 INJECTION, SOLUTION INTRAMUSCULAR; INTRAVENOUS; SUBCUTANEOUS at 13:28

## 2025-01-23 RX ADMIN — MIDAZOLAM HYDROCHLORIDE 2 MG: 1 INJECTION, SOLUTION INTRAMUSCULAR; INTRAVENOUS at 10:28

## 2025-01-23 RX ADMIN — FENTANYL CITRATE 50 MCG: 50 INJECTION, SOLUTION INTRAMUSCULAR; INTRAVENOUS at 12:37

## 2025-01-23 RX ADMIN — FENTANYL CITRATE 50 MCG: 50 INJECTION, SOLUTION INTRAMUSCULAR; INTRAVENOUS at 11:41

## 2025-01-23 RX ADMIN — FENTANYL CITRATE 50 MCG: 50 INJECTION, SOLUTION INTRAMUSCULAR; INTRAVENOUS at 10:28

## 2025-01-23 RX ADMIN — DEXAMETHASONE SODIUM PHOSPHATE 8 MG: 4 INJECTION, SOLUTION INTRAMUSCULAR; INTRAVENOUS at 10:28

## 2025-01-23 RX ADMIN — HYDROMORPHONE HYDROCHLORIDE 0.5 MG: 1 INJECTION, SOLUTION INTRAMUSCULAR; INTRAVENOUS; SUBCUTANEOUS at 13:07

## 2025-01-23 RX ADMIN — ROCURONIUM BROMIDE 50 MG: 10 INJECTION INTRAVENOUS at 10:34

## 2025-01-23 RX ADMIN — ONDANSETRON HYDROCHLORIDE 4 MG: 2 SOLUTION INTRAMUSCULAR; INTRAVENOUS at 11:49

## 2025-01-23 RX ADMIN — SUGAMMADEX 200 MG: 100 INJECTION, SOLUTION INTRAVENOUS at 12:53

## 2025-01-23 RX ADMIN — DEXMEDETOMIDINE 6 MCG: 100 INJECTION, SOLUTION INTRAVENOUS at 11:54

## 2025-01-23 RX ADMIN — FENTANYL CITRATE 50 MCG: 50 INJECTION, SOLUTION INTRAMUSCULAR; INTRAVENOUS at 11:37

## 2025-01-23 RX ADMIN — SODIUM CHLORIDE: 9 INJECTION, SOLUTION INTRAVENOUS at 09:08

## 2025-01-23 RX ADMIN — PROPOFOL 200 MG: 10 INJECTION, EMULSION INTRAVENOUS at 10:34

## 2025-01-23 RX ADMIN — PROPOFOL 100 MG: 10 INJECTION, EMULSION INTRAVENOUS at 11:30

## 2025-01-23 ASSESSMENT — PAIN SCALES - GENERAL
PAINLEVEL_OUTOF10: 4
PAINLEVEL_OUTOF10: 8
PAINLEVEL_OUTOF10: 5
PAINLEVEL_OUTOF10: 6

## 2025-01-23 ASSESSMENT — PAIN DESCRIPTION - LOCATION
LOCATION: FACE;NOSE
LOCATION: FACE
LOCATION: FACE;NOSE

## 2025-01-23 ASSESSMENT — PAIN - FUNCTIONAL ASSESSMENT
PAIN_FUNCTIONAL_ASSESSMENT: ACTIVITIES ARE NOT PREVENTED
PAIN_FUNCTIONAL_ASSESSMENT: 0-10
PAIN_FUNCTIONAL_ASSESSMENT: ACTIVITIES ARE NOT PREVENTED

## 2025-01-23 ASSESSMENT — PAIN DESCRIPTION - ORIENTATION
ORIENTATION: UPPER;ANTERIOR
ORIENTATION: UPPER;ANTERIOR

## 2025-01-23 ASSESSMENT — PAIN DESCRIPTION - PAIN TYPE: TYPE: SURGICAL PAIN

## 2025-01-23 ASSESSMENT — PAIN DESCRIPTION - FREQUENCY: FREQUENCY: CONTINUOUS

## 2025-01-23 ASSESSMENT — PAIN DESCRIPTION - DESCRIPTORS
DESCRIPTORS: PRESSURE;THROBBING
DESCRIPTORS: ACHING;PRESSURE
DESCRIPTORS: ACHING;THROBBING;PRESSURE

## 2025-01-23 NOTE — DISCHARGE INSTRUCTIONS
Endoscopic Sinus Surgery: Postoperative Instructions     What to Expect After Endoscopic Sinus Surgery:  Bleeding: It is normal to have some bloody discharge as well as some bloody postnasal (throat) drainage for the first 3-5 days after sinus surgery, especially after you irrigate your sinuses. If steady or heavy bleeding occurs after surgery, tilt your head back slightly and breathe through your nose gently. You may dab your nose with tissue but avoid any nose blowing. If this does not stop the bleeding you may use Afrin spray. Several sprays will usually stop any bleeding. If Afrin fails to stop steady nasal bleeding then you should call our office or the on call doctor (see contact below).   Pain: You should expect some nasal and sinus pressure and pain for the first several days after surgery. This may feel like a sinus infection or a dull ache in your sinuses. Extra-strength Tylenol is often all that is needed for mild post-operative discomfort. You should avoid aspirin and NSAIDs such as Motrin, Advil, and Aleve (see below) for 3 days after surgery. If Tylenol is not sufficient to control the pain, you should use the post-operative pain medication prescribed by your doctor.   Fatigue: You can expect to feel very tired for the first week after surgery. This is normal and most patients plan on taking at least 1 week off of work to recover. Every patient is different and some return to work sooner.   Nasal congestion and discharge: You will have nasal congestion and discharge for the first few weeks after surgery. Your nasal passage and breathing should return to normal 2-3 weeks after surgery.   Postoperative visits: You will have a certain number of postoperative visits depending on what surgery you have. During these visits we will clean your nose and sinuses of fluid and blood left behind after surgery. These visits are very important to aid the healing process so it is essential that you attend all

## 2025-01-23 NOTE — ANESTHESIA PRE PROCEDURE
Department of Anesthesiology  Preprocedure Note       Name:  Ginny Joyner   Age:  39 y.o.  :  1985                                          MRN:  [id number]         Date:  2025      Surgeon: Surgeon(s):  Brandon García MD    Procedure: Procedure(s):  BILATERAL ENDOSCOPIC SINUS SURGERY WITH IMAGE GUIDANCE (ANES GENERAL ET)    Medications prior to admission:   Prior to Admission medications    Medication Sig Start Date End Date Taking? Authorizing Provider   Acetaminophen (TYLENOL 8 HOUR PO) Take 1 tablet by mouth as needed   Yes Provider, MD Coy   Cetirizine HCl (ZYRTEC ALLERGY PO) Take 1 tablet by mouth as needed   Yes Provider, MD Coy   Budeson-Glycopyrrol-Formoterol (BREZTRI AEROSPHERE) 160-9-4.8 MCG/ACT AERO Inhale 2 puffs into the lungs in the morning and at bedtime   Yes Provider, MD Coy   albuterol sulfate HFA (PROVENTIL;VENTOLIN;PROAIR) 108 (90 Base) MCG/ACT inhaler Inhale 2 puffs into the lungs every 4 hours as needed 3/10/21  Yes Automatic Reconciliation, Ar   albuterol (PROVENTIL) (2.5 MG/3ML) 0.083% nebulizer solution Inhale 3 mLs into the lungs every 4 hours as needed 5/3/21  Yes Automatic Reconciliation, Ar   fluticasone (FLONASE) 50 MCG/ACT nasal spray 1 spray by Nasal route daily as needed 22  Yes Automatic Reconciliation, Ar       Current medications:    No current facility-administered medications for this encounter.     Facility-Administered Medications Ordered in Other Encounters   Medication Dose Route Frequency Provider Last Rate Last Admin    iopamidol (ISOVUE-370) 76 % injection 100 mL  100 mL IntraVENous ONCE PRN Kay Seymour MD           Allergies:    Allergies   Allergen Reactions    Bee Venom Swelling     At the site    Orange Hives       Problem List:    Patient Active Problem List   Diagnosis Code    Proximal phalanx fracture of finger S62.619A    Poor fetal growth, affecting management of mother, antepartum condition or complication

## 2025-01-23 NOTE — OP NOTE
NAME: Ginny Joyner  MRN: [id number]  DATE: 1/23/2025      PREOPERATIVE DIAGNOSIS: Chronic rhinosinusitis    POSTOPERATIVE DIAGNOSIS: Same as preoperative diagnosis.    PROCEDURES PERFORMED:  Functional Endoscopic Sinus Surgery with image guidance to include:  Bilateral maxillary antrostomies with removal of tissue.  Bilateral anterior and posterior ethmoidectomies.  Bilateral sphenoidotomies.  Bilateral frontal sinusotomies.   Endoscopic debridement of bilateral inferior turbinates.    SURGEON: Brandon García MD    ASSISTANT: None.    ANESTHESIA: General    COMPLICATIONS:  None (see findings).     BLOOD LOSS:   50 cc.    FINDINGS: Diffuse inflammatory changes bilaterally. Lumpy-bumpy mucosa involving septum and turbinates bilaterally.  Very thick mucoid material filling bilateral maxillary sinuses.  Diffuse inflammation and polyps in anterior and posterior ethmoid cavities.     SPECIMENS: Bilateral nasal and sinus contents (2).     INDICATIONS FOR SURGERY:  The patient is a 39 y.o. female with a history of chronic sinusitis.     PROCEDURE DETAILS: The patient's records were reviewed and previous sinus imaging was closely reviewed. The patient was met in the pre-operative area.  The procedure was discussed.  Potential risks of sinus surgery were reviewed to include excessive bleeding, damage to eye, leakage of cerebrospinal fluid, scarring, loss of smell, infection, no improvement and possible need for revision surgery.  A signed consent was obtained and witnessed.   The patient was brought to the operative room and placed in a supine position on the OR table.  General anesthesia was commenced and the patient was endotracheally intubated.   The table was rotated 180 degrees.      The patient was positioned in the usual manner for endoscopic sinus surgery. Drapes were applied. The [employer] Fusion image-guidance system tracking device was positioned on the forehead and the device was calibrated without event. A
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
## 2025-02-23 ENCOUNTER — HOSPITAL ENCOUNTER (OUTPATIENT)
Facility: HOSPITAL | Age: 40
Discharge: HOME | End: 2025-02-26
Attending: INTERNAL MEDICINE
Payer: COMMERCIAL

## 2025-02-23 DIAGNOSIS — D86.9 SARCOID: ICD-10-CM

## 2025-02-23 PROCEDURE — 6360000004 HC RX CONTRAST MEDICATION: Performed by: INTERNAL MEDICINE

## 2025-02-23 PROCEDURE — 71260 CT THORAX DX C+: CPT

## 2025-02-23 RX ORDER — IOPAMIDOL 755 MG/ML
80 INJECTION, SOLUTION INTRAVASCULAR
Status: COMPLETED | OUTPATIENT
Start: 2025-02-23 | End: 2025-02-23

## 2025-02-23 RX ADMIN — IOPAMIDOL 80 ML: 755 INJECTION, SOLUTION INTRAVENOUS at 13:11

## 2025-03-28 ENCOUNTER — TRANSCRIBE ORDERS (OUTPATIENT)
Facility: HOSPITAL | Age: 40
End: 2025-03-28

## 2025-03-28 DIAGNOSIS — D86.9 SARCOID: Primary | ICD-10-CM

## 2025-06-18 ENCOUNTER — TRANSCRIBE ORDERS (OUTPATIENT)
Facility: HOSPITAL | Age: 40
End: 2025-06-18

## 2025-06-18 DIAGNOSIS — Z15.01 GENETIC SUSCEPTIBILITY TO MALIGNANT NEOPLASM OF BREAST: Primary | ICD-10-CM

## (undated) DEVICE — INSTRUMENT TRACKER 9733533XOM ENT 1PK

## (undated) DEVICE — AIRLIFE™ FACE TENT MASK VINYL: Brand: AIRLIFE™

## (undated) DEVICE — BLOCK BITE PEDIATRIC 14 MM MOUTHPIECE POLYETH ENDO-GUARD LTX 69100] AVANOS MEDICAL]

## (undated) DEVICE — SYRINGE MED 10CC ECC TIP W/O NDL

## (undated) DEVICE — Z DISCONTINUED NO SUB IDED SET EXTN W/ 4 W STPCOCK M SPIN LOK 36IN

## (undated) DEVICE — BAG BELONG PT PERS CLEAR HANDL

## (undated) DEVICE — SUTURE VCRL + SZ 0 L36IN ABSRB VLT L40MM CT 1/2 CIR TAPR VCP358H

## (undated) DEVICE — Device

## (undated) DEVICE — MINOR ENT-SFMCASU: Brand: MEDLINE INDUSTRIES, INC.

## (undated) DEVICE — KIT,ANTI FOG,W/SPONGE & FLUID,SOFT PACK: Brand: MEDLINE

## (undated) DEVICE — BRUSH CYTO BRONCHSCP 1.5/140MM -- CELLEBRITY

## (undated) DEVICE — SYRINGE FLSH IV STD 10 CC W/O CANN PREFILLED NACL POSIFLUSH 306546

## (undated) DEVICE — SYR 3ML LL TIP 1/10ML GRAD --

## (undated) DEVICE — KIT COLON W/ 1.1OZ LUB AND 2 END

## (undated) DEVICE — SET ADMIN 16ML TBNG L100IN 2 Y INJ SITE IV PIGGY BK DISP

## (undated) DEVICE — SYR 5ML 1/5 GRAD LL NSAF LF --

## (undated) DEVICE — LARGE, DISPOSABLE ALEXIS O C-SECTION PROTECTOR - RETRACTOR: Brand: ALEXIS ® O C-SECTION PROTECTOR - RETRACTOR

## (undated) DEVICE — AIRLIFE™ CORRUGATED FLEXIBLE EVA TUBING FOR AEROSOL AND IPPB USE, SEGMENTED, 6 FEET (1.8 M) LENGTH, 22 MM I.D.: Brand: AIRLIFE™

## (undated) DEVICE — DISSECTOR ENDOSCP L21CM TIP CURVATURE 40DEG FN CRV JAW VES

## (undated) DEVICE — PATIENT TRACKER 9734887XOM NON-INVASIVE

## (undated) DEVICE — SUTURE PLN GUT SZ 2-0 L27IN ABSRB YELLOWISH TAN L70MM XLH 53T

## (undated) DEVICE — AIRLIFE™ ADULT OXYGEN MASK VINYL, UNDER-THE-CHIN STYLE, 3 IN 1 MASK WITH 7 FEET (2.1 M) CRUSH-RESISTANT TUBING AND U/CONNECT-IT ADAPTER: Brand: AIRLIFE™

## (undated) DEVICE — ATTACHMENT SMK 3/8INX10FT VALLEYLAB

## (undated) DEVICE — DRESSING SIL W4XL5IN ANTIBACT GELLING FBR CYTOFORM

## (undated) DEVICE — SOLUTION IV 1000ML 0.9% SOD CHL PH 5 INJ USP VIAFLX PLAS

## (undated) DEVICE — TUBING 1895522 5PK STRAIGHTSHOT TO XPS: Brand: STRAIGHTSHOT®

## (undated) DEVICE — Device: Brand: HEMOPORE

## (undated) DEVICE — CESAREAN BIRTH PACK: Brand: MEDLINE INDUSTRIES, INC.

## (undated) DEVICE — SINGLE USE ASPIRATION NEEDLE: Brand: SINGLE USE ASPIRATION NEEDLE

## (undated) DEVICE — BITEBLOCK ENDOSCP 60FR MAXI WHT POLYETH STURDY W/ VELC WVN

## (undated) DEVICE — PAD,NON-ADHERENT,W/AD,3X4,ST,LF,1/PK: Brand: MEDLINE

## (undated) DEVICE — GLOVE ORANGE PI 7 1/2   MSG9075

## (undated) DEVICE — NDL FLTR TIP 5 MIC 18GX1.5IN --

## (undated) DEVICE — SURGICAL PROCEDURE PACK TISS 3X5 IN ABSORBABLE SEPRAFILM

## (undated) DEVICE — SUTURE MCRYL SZ 3-0 L27IN ABSRB UD L60MM KS STR REV CUT Y523H

## (undated) DEVICE — CATH IV AUTOGRD BC PNK 20GA 25 -- INSYTE

## (undated) DEVICE — SOLUTION IRRIG 1000ML STRL H2O USP PLAS POUR BTL

## (undated) DEVICE — WOUND RETRACTOR AND PROTECTOR: Brand: ALEXIS O WOUND PROTECTOR-RETRACTOR

## (undated) DEVICE — ELECTRODE,RADIOTRANSLUCENT,FOAM,3PK: Brand: MEDLINE

## (undated) DEVICE — APPLICATOR MEDICATED 26 CC SOLUTION HI LT ORNG CHLORAPREP

## (undated) DEVICE — FCPS BIOP PULM RAD JAW 100CML -- BX/10 M00515180

## (undated) DEVICE — DRAPE FLD WRM W44XL66IN C6L FOR INTRATEMP SYS THERMABASIN

## (undated) DEVICE — TRAP,MUCUS SPECIMEN, 80CC: Brand: MEDLINE

## (undated) DEVICE — DEVICE ES L3M EDGE COAT HEX LOK BLDE ELECTRD HOLSTER FORC

## (undated) DEVICE — BLADE 1884080EM TRICUT 4MMX13CM M4 ROHS: Brand: FUSION®

## (undated) DEVICE — ADULT SPO2 SENSOR: Brand: NELLCOR

## (undated) DEVICE — Device: Brand: BALLOON

## (undated) DEVICE — SOLIDIFIER MEDC 1200ML -- CONVERT TO 356117

## (undated) DEVICE — EXTRA LARGE, DISPOSABLE C-SECTION PROTECTOR - RETRACTOR: Brand: ALEXIS ® O C-SECTION PROTECTOR - RETRACTOR

## (undated) DEVICE — 1200 GUARD II KIT W/5MM TUBE W/O VAC TUBE: Brand: GUARDIAN

## (undated) DEVICE — NDL PRT INJ NSAF BLNT 18GX1.5 --

## (undated) DEVICE — SOLUTION IRRIG 1000ML 0.9% SOD CHL USP POUR PLAS BTL

## (undated) DEVICE — SIMPLICITY FLUFF UNDERPAD 23X36, MODERATE: Brand: SIMPLICITY

## (undated) DEVICE — GARMENT,MEDLINE,DVT,INT,CALF,MED, GEN2: Brand: MEDLINE